# Patient Record
Sex: MALE | Race: BLACK OR AFRICAN AMERICAN | NOT HISPANIC OR LATINO | Employment: FULL TIME | URBAN - METROPOLITAN AREA
[De-identification: names, ages, dates, MRNs, and addresses within clinical notes are randomized per-mention and may not be internally consistent; named-entity substitution may affect disease eponyms.]

---

## 2017-01-02 ENCOUNTER — HOSPITAL ENCOUNTER (OUTPATIENT)
Facility: HOSPITAL | Age: 37
Discharge: HOME | End: 2017-01-03
Attending: EMERGENCY MEDICINE | Admitting: SURGERY
Payer: COMMERCIAL

## 2017-01-02 ENCOUNTER — ANESTHESIA (OUTPATIENT)
Dept: SURGERY | Facility: HOSPITAL | Age: 37
End: 2017-01-02
Payer: COMMERCIAL

## 2017-01-02 ENCOUNTER — SURGERY (OUTPATIENT)
Age: 37
End: 2017-01-02

## 2017-01-02 ENCOUNTER — ANESTHESIA EVENT (OUTPATIENT)
Dept: SURGERY | Facility: HOSPITAL | Age: 37
End: 2017-01-02
Payer: COMMERCIAL

## 2017-01-02 DIAGNOSIS — R11.10 VOMITING, INTRACTABILITY OF VOMITING NOT SPECIFIED, PRESENCE OF NAUSEA NOT SPECIFIED, UNSPECIFIED VOMITING TYPE: ICD-10-CM

## 2017-01-02 DIAGNOSIS — R10.13 EPIGASTRIC ABDOMINAL PAIN: ICD-10-CM

## 2017-01-02 DIAGNOSIS — K81.0 CHOLECYSTITIS, ACUTE: ICD-10-CM

## 2017-01-02 DIAGNOSIS — K81.9 CHOLECYSTITIS: Primary | ICD-10-CM

## 2017-01-02 LAB
ALBUMIN SERPL BCP-MCNC: 3.8 G/DL
ALP SERPL-CCNC: 70 U/L
ALT SERPL W/O P-5'-P-CCNC: 45 U/L
ANION GAP SERPL CALC-SCNC: 11 MMOL/L
AST SERPL-CCNC: 31 U/L
BASOPHILS # BLD AUTO: 0 K/UL
BASOPHILS NFR BLD: 0.2 %
BILIRUB SERPL-MCNC: 0.5 MG/DL
BILIRUB UR QL STRIP: NEGATIVE
BUN SERPL-MCNC: 14 MG/DL
CALCIUM SERPL-MCNC: 9.4 MG/DL
CHLORIDE SERPL-SCNC: 105 MMOL/L
CLARITY UR: CLEAR
CO2 SERPL-SCNC: 22 MMOL/L
COLOR UR: YELLOW
CREAT SERPL-MCNC: 1.1 MG/DL
DIFFERENTIAL METHOD: ABNORMAL
EOSINOPHIL # BLD AUTO: 0 K/UL
EOSINOPHIL NFR BLD: 0.6 %
ERYTHROCYTE [DISTWIDTH] IN BLOOD BY AUTOMATED COUNT: 13.8 %
EST. GFR  (AFRICAN AMERICAN): >60 ML/MIN/1.73 M^2
EST. GFR  (NON AFRICAN AMERICAN): >60 ML/MIN/1.73 M^2
GLUCOSE SERPL-MCNC: 122 MG/DL
GLUCOSE UR QL STRIP: NEGATIVE
HCT VFR BLD AUTO: 44.3 %
HGB BLD-MCNC: 14.9 G/DL
HGB UR QL STRIP: NEGATIVE
KETONES UR QL STRIP: NEGATIVE
LEUKOCYTE ESTERASE UR QL STRIP: NEGATIVE
LIPASE SERPL-CCNC: 57 U/L
LYMPHOCYTES # BLD AUTO: 1.9 K/UL
LYMPHOCYTES NFR BLD: 23.4 %
MCH RBC QN AUTO: 30.4 PG
MCHC RBC AUTO-ENTMCNC: 33.8 %
MCV RBC AUTO: 90 FL
MONOCYTES # BLD AUTO: 0.2 K/UL
MONOCYTES NFR BLD: 2.3 %
NEUTROPHILS # BLD AUTO: 6 K/UL
NEUTROPHILS NFR BLD: 73.5 %
NITRITE UR QL STRIP: NEGATIVE
PH UR STRIP: >8 [PH] (ref 5–8)
PLATELET # BLD AUTO: 306 K/UL
PMV BLD AUTO: 7.2 FL
POTASSIUM SERPL-SCNC: 4.4 MMOL/L
PROT SERPL-MCNC: 7.9 G/DL
PROT UR QL STRIP: NEGATIVE
RBC # BLD AUTO: 4.91 M/UL
SODIUM SERPL-SCNC: 138 MMOL/L
SP GR UR STRIP: 1.02 (ref 1–1.03)
URN SPEC COLLECT METH UR: ABNORMAL
UROBILINOGEN UR STRIP-ACNC: NEGATIVE EU/DL
WBC # BLD AUTO: 8.2 K/UL

## 2017-01-02 PROCEDURE — 37000008 HC ANESTHESIA 1ST 15 MINUTES: Performed by: SURGERY

## 2017-01-02 PROCEDURE — 25000003 PHARM REV CODE 250: Performed by: NURSE ANESTHETIST, CERTIFIED REGISTERED

## 2017-01-02 PROCEDURE — 88304 TISSUE EXAM BY PATHOLOGIST: CPT | Performed by: PATHOLOGY

## 2017-01-02 PROCEDURE — 36415 COLL VENOUS BLD VENIPUNCTURE: CPT

## 2017-01-02 PROCEDURE — 85025 COMPLETE CBC W/AUTO DIFF WBC: CPT

## 2017-01-02 PROCEDURE — 36000708 HC OR TIME LEV III 1ST 15 MIN: Performed by: SURGERY

## 2017-01-02 PROCEDURE — 96376 TX/PRO/DX INJ SAME DRUG ADON: CPT

## 2017-01-02 PROCEDURE — 63600175 PHARM REV CODE 636 W HCPCS: Performed by: EMERGENCY MEDICINE

## 2017-01-02 PROCEDURE — 37000009 HC ANESTHESIA EA ADD 15 MINS: Performed by: SURGERY

## 2017-01-02 PROCEDURE — 25000003 PHARM REV CODE 250: Performed by: EMERGENCY MEDICINE

## 2017-01-02 PROCEDURE — 47562 LAPAROSCOPIC CHOLECYSTECTOMY: CPT | Mod: ,,, | Performed by: SURGERY

## 2017-01-02 PROCEDURE — 99285 EMERGENCY DEPT VISIT HI MDM: CPT | Mod: 25

## 2017-01-02 PROCEDURE — 63600175 PHARM REV CODE 636 W HCPCS: Performed by: NURSE ANESTHETIST, CERTIFIED REGISTERED

## 2017-01-02 PROCEDURE — 25000003 PHARM REV CODE 250: Performed by: ANESTHESIOLOGY

## 2017-01-02 PROCEDURE — 94799 UNLISTED PULMONARY SVC/PX: CPT

## 2017-01-02 PROCEDURE — 96374 THER/PROPH/DIAG INJ IV PUSH: CPT

## 2017-01-02 PROCEDURE — 83690 ASSAY OF LIPASE: CPT

## 2017-01-02 PROCEDURE — 36000709 HC OR TIME LEV III EA ADD 15 MIN: Performed by: SURGERY

## 2017-01-02 PROCEDURE — D9220A PRA ANESTHESIA: Mod: CRNA,,, | Performed by: NURSE ANESTHETIST, CERTIFIED REGISTERED

## 2017-01-02 PROCEDURE — 27201423 OPTIME MED/SURG SUP & DEVICES STERILE SUPPLY: Performed by: SURGERY

## 2017-01-02 PROCEDURE — 93005 ELECTROCARDIOGRAM TRACING: CPT

## 2017-01-02 PROCEDURE — 71000033 HC RECOVERY, INTIAL HOUR: Performed by: SURGERY

## 2017-01-02 PROCEDURE — D9220A PRA ANESTHESIA: Mod: ANES,,, | Performed by: ANESTHESIOLOGY

## 2017-01-02 PROCEDURE — 25000003 PHARM REV CODE 250: Performed by: SURGERY

## 2017-01-02 PROCEDURE — 71000039 HC RECOVERY, EACH ADD'L HOUR: Performed by: SURGERY

## 2017-01-02 PROCEDURE — 81003 URINALYSIS AUTO W/O SCOPE: CPT

## 2017-01-02 PROCEDURE — 80053 COMPREHEN METABOLIC PANEL: CPT

## 2017-01-02 RX ORDER — HYDROMORPHONE HYDROCHLORIDE 2 MG/ML
1 INJECTION, SOLUTION INTRAMUSCULAR; INTRAVENOUS; SUBCUTANEOUS EVERY 4 HOURS PRN
Status: DISCONTINUED | OUTPATIENT
Start: 2017-01-02 | End: 2017-01-03 | Stop reason: HOSPADM

## 2017-01-02 RX ORDER — HYDROCODONE BITARTRATE AND ACETAMINOPHEN 7.5; 325 MG/1; MG/1
1 TABLET ORAL EVERY 4 HOURS PRN
Qty: 40 TABLET | Refills: 0 | Status: SHIPPED | OUTPATIENT
Start: 2017-01-02 | End: 2017-01-12

## 2017-01-02 RX ORDER — SODIUM CHLORIDE, SODIUM LACTATE, POTASSIUM CHLORIDE, CALCIUM CHLORIDE 600; 310; 30; 20 MG/100ML; MG/100ML; MG/100ML; MG/100ML
INJECTION, SOLUTION INTRAVENOUS CONTINUOUS PRN
Status: DISCONTINUED | OUTPATIENT
Start: 2017-01-02 | End: 2017-01-02

## 2017-01-02 RX ORDER — ROCURONIUM BROMIDE 10 MG/ML
INJECTION, SOLUTION INTRAVENOUS
Status: DISCONTINUED | OUTPATIENT
Start: 2017-01-02 | End: 2017-01-02

## 2017-01-02 RX ORDER — KETOROLAC TROMETHAMINE 30 MG/ML
INJECTION, SOLUTION INTRAMUSCULAR; INTRAVENOUS
Status: DISCONTINUED | OUTPATIENT
Start: 2017-01-02 | End: 2017-01-02

## 2017-01-02 RX ORDER — SODIUM CHLORIDE 9 MG/ML
INJECTION, SOLUTION INTRAVENOUS CONTINUOUS
Status: DISCONTINUED | OUTPATIENT
Start: 2017-01-02 | End: 2017-01-03 | Stop reason: HOSPADM

## 2017-01-02 RX ORDER — FAMOTIDINE 20 MG/1
20 TABLET, FILM COATED ORAL 2 TIMES DAILY
Qty: 20 TABLET | Refills: 0 | Status: SHIPPED | OUTPATIENT
Start: 2017-01-02 | End: 2020-08-04

## 2017-01-02 RX ORDER — SUCRALFATE 1 G/1
1 TABLET ORAL 4 TIMES DAILY
Qty: 20 TABLET | Refills: 0 | Status: SHIPPED | OUTPATIENT
Start: 2017-01-02 | End: 2020-08-04

## 2017-01-02 RX ORDER — ONDANSETRON 4 MG/1
4 TABLET, ORALLY DISINTEGRATING ORAL EVERY 8 HOURS PRN
Qty: 12 TABLET | Refills: 0 | Status: SHIPPED | OUTPATIENT
Start: 2017-01-02 | End: 2020-08-04

## 2017-01-02 RX ORDER — MORPHINE SULFATE 4 MG/ML
8 INJECTION, SOLUTION INTRAMUSCULAR; INTRAVENOUS
Status: COMPLETED | OUTPATIENT
Start: 2017-01-02 | End: 2017-01-02

## 2017-01-02 RX ORDER — PANTOPRAZOLE SODIUM 40 MG/10ML
40 INJECTION, POWDER, LYOPHILIZED, FOR SOLUTION INTRAVENOUS
Status: DISCONTINUED | OUTPATIENT
Start: 2017-01-03 | End: 2017-01-03 | Stop reason: HOSPADM

## 2017-01-02 RX ORDER — FENTANYL CITRATE 50 UG/ML
25 INJECTION, SOLUTION INTRAMUSCULAR; INTRAVENOUS EVERY 5 MIN PRN
Status: DISCONTINUED | OUTPATIENT
Start: 2017-01-02 | End: 2017-01-02 | Stop reason: HOSPADM

## 2017-01-02 RX ORDER — ACETAMINOPHEN 10 MG/ML
INJECTION, SOLUTION INTRAVENOUS
Status: DISCONTINUED | OUTPATIENT
Start: 2017-01-02 | End: 2017-01-02

## 2017-01-02 RX ORDER — PHENYLEPHRINE HYDROCHLORIDE 10 MG/ML
INJECTION INTRAVENOUS
Status: DISCONTINUED | OUTPATIENT
Start: 2017-01-02 | End: 2017-01-02

## 2017-01-02 RX ORDER — NEOSTIGMINE METHYLSULFATE 1 MG/ML
INJECTION, SOLUTION INTRAVENOUS
Status: DISCONTINUED | OUTPATIENT
Start: 2017-01-02 | End: 2017-01-02

## 2017-01-02 RX ORDER — PROPOFOL 10 MG/ML
VIAL (ML) INTRAVENOUS
Status: DISCONTINUED | OUTPATIENT
Start: 2017-01-02 | End: 2017-01-02

## 2017-01-02 RX ORDER — MIDAZOLAM HYDROCHLORIDE 1 MG/ML
INJECTION, SOLUTION INTRAMUSCULAR; INTRAVENOUS
Status: DISCONTINUED | OUTPATIENT
Start: 2017-01-02 | End: 2017-01-02

## 2017-01-02 RX ORDER — HYDROCODONE BITARTRATE AND ACETAMINOPHEN 5; 325 MG/1; MG/1
1 TABLET ORAL EVERY 4 HOURS PRN
Status: DISCONTINUED | OUTPATIENT
Start: 2017-01-02 | End: 2017-01-03 | Stop reason: HOSPADM

## 2017-01-02 RX ORDER — LIDOCAINE HYDROCHLORIDE 20 MG/ML
JELLY TOPICAL
Status: DISCONTINUED | OUTPATIENT
Start: 2017-01-02 | End: 2017-01-02

## 2017-01-02 RX ORDER — LIDOCAINE HCL/PF 100 MG/5ML
SYRINGE (ML) INTRAVENOUS
Status: DISCONTINUED | OUTPATIENT
Start: 2017-01-02 | End: 2017-01-02

## 2017-01-02 RX ORDER — ENOXAPARIN SODIUM 100 MG/ML
40 INJECTION SUBCUTANEOUS
Status: DISCONTINUED | OUTPATIENT
Start: 2017-01-03 | End: 2017-01-03 | Stop reason: HOSPADM

## 2017-01-02 RX ORDER — KETAMINE HYDROCHLORIDE 100 MG/ML
INJECTION, SOLUTION INTRAMUSCULAR; INTRAVENOUS
Status: DISCONTINUED | OUTPATIENT
Start: 2017-01-02 | End: 2017-01-02

## 2017-01-02 RX ORDER — FENTANYL CITRATE 50 UG/ML
INJECTION, SOLUTION INTRAMUSCULAR; INTRAVENOUS
Status: DISCONTINUED | OUTPATIENT
Start: 2017-01-02 | End: 2017-01-02

## 2017-01-02 RX ORDER — DEXAMETHASONE SODIUM PHOSPHATE 4 MG/ML
INJECTION, SOLUTION INTRA-ARTICULAR; INTRALESIONAL; INTRAMUSCULAR; INTRAVENOUS; SOFT TISSUE
Status: DISCONTINUED | OUTPATIENT
Start: 2017-01-02 | End: 2017-01-02

## 2017-01-02 RX ORDER — MORPHINE SULFATE 4 MG/ML
4 INJECTION, SOLUTION INTRAMUSCULAR; INTRAVENOUS
Status: COMPLETED | OUTPATIENT
Start: 2017-01-02 | End: 2017-01-02

## 2017-01-02 RX ORDER — SUCCINYLCHOLINE CHLORIDE 20 MG/ML
INJECTION INTRAMUSCULAR; INTRAVENOUS
Status: DISCONTINUED | OUTPATIENT
Start: 2017-01-02 | End: 2017-01-02

## 2017-01-02 RX ORDER — MEPERIDINE HYDROCHLORIDE 50 MG/ML
12.5 INJECTION INTRAMUSCULAR; INTRAVENOUS; SUBCUTANEOUS ONCE
Status: COMPLETED | OUTPATIENT
Start: 2017-01-02 | End: 2017-01-02

## 2017-01-02 RX ORDER — ONDANSETRON 2 MG/ML
4 INJECTION INTRAMUSCULAR; INTRAVENOUS EVERY 8 HOURS PRN
Status: DISCONTINUED | OUTPATIENT
Start: 2017-01-02 | End: 2017-01-03 | Stop reason: HOSPADM

## 2017-01-02 RX ORDER — ONDANSETRON HYDROCHLORIDE 2 MG/ML
INJECTION, SOLUTION INTRAMUSCULAR; INTRAVENOUS
Status: DISCONTINUED | OUTPATIENT
Start: 2017-01-02 | End: 2017-01-02

## 2017-01-02 RX ORDER — GLYCOPYRROLATE 0.2 MG/ML
INJECTION INTRAMUSCULAR; INTRAVENOUS
Status: DISCONTINUED | OUTPATIENT
Start: 2017-01-02 | End: 2017-01-02

## 2017-01-02 RX ORDER — BUPIVACAINE HYDROCHLORIDE AND EPINEPHRINE 2.5; 5 MG/ML; UG/ML
INJECTION, SOLUTION EPIDURAL; INFILTRATION; INTRACAUDAL; PERINEURAL
Status: DISCONTINUED | OUTPATIENT
Start: 2017-01-02 | End: 2017-01-02 | Stop reason: HOSPADM

## 2017-01-02 RX ORDER — ONDANSETRON 4 MG/1
4 TABLET, ORALLY DISINTEGRATING ORAL
Status: COMPLETED | OUTPATIENT
Start: 2017-01-02 | End: 2017-01-02

## 2017-01-02 RX ORDER — SODIUM CHLORIDE 0.9 % (FLUSH) 0.9 %
3 SYRINGE (ML) INJECTION
Status: DISCONTINUED | OUTPATIENT
Start: 2017-01-02 | End: 2017-01-02 | Stop reason: HOSPADM

## 2017-01-02 RX ORDER — CEFAZOLIN SODIUM 1 G/3ML
INJECTION, POWDER, FOR SOLUTION INTRAMUSCULAR; INTRAVENOUS
Status: DISCONTINUED | OUTPATIENT
Start: 2017-01-02 | End: 2017-01-02

## 2017-01-02 RX ADMIN — MORPHINE SULFATE 8 MG: 4 INJECTION, SOLUTION INTRAMUSCULAR; INTRAVENOUS at 06:01

## 2017-01-02 RX ADMIN — SODIUM CHLORIDE: 0.9 INJECTION, SOLUTION INTRAVENOUS at 02:01

## 2017-01-02 RX ADMIN — MIDAZOLAM 2 MG: 1 INJECTION INTRAMUSCULAR; INTRAVENOUS at 11:01

## 2017-01-02 RX ADMIN — CEFAZOLIN 2 G: 1 INJECTION, POWDER, FOR SOLUTION INTRAVENOUS at 12:01

## 2017-01-02 RX ADMIN — FENTANYL CITRATE 100 MCG: 50 INJECTION INTRAMUSCULAR; INTRAVENOUS at 12:01

## 2017-01-02 RX ADMIN — PHENYLEPHRINE HYDROCHLORIDE 100 MCG: 10 INJECTION INTRAVENOUS at 12:01

## 2017-01-02 RX ADMIN — DEXAMETHASONE SODIUM PHOSPHATE 4 MG: 4 INJECTION, SOLUTION INTRAMUSCULAR; INTRAVENOUS at 12:01

## 2017-01-02 RX ADMIN — KETOROLAC TROMETHAMINE 30 MG: 30 INJECTION, SOLUTION INTRAMUSCULAR; INTRAVENOUS at 12:01

## 2017-01-02 RX ADMIN — GLYCOPYRROLATE 0.4 MG: 0.2 INJECTION, SOLUTION INTRAMUSCULAR; INTRAVENOUS at 12:01

## 2017-01-02 RX ADMIN — SUCCINYLCHOLINE CHLORIDE 200 MG: 20 INJECTION, SOLUTION INTRAMUSCULAR; INTRAVENOUS at 12:01

## 2017-01-02 RX ADMIN — BUPIVACAINE HYDROCHLORIDE AND EPINEPHRINE BITARTRATE 30 ML: 2.5; .0091 INJECTION, SOLUTION EPIDURAL; INFILTRATION; INTRACAUDAL; PERINEURAL at 12:01

## 2017-01-02 RX ADMIN — ONDANSETRON 4 MG: 2 INJECTION, SOLUTION INTRAMUSCULAR; INTRAVENOUS at 12:01

## 2017-01-02 RX ADMIN — MEPERIDINE HYDROCHLORIDE 12.5 MG: 50 INJECTION INTRAMUSCULAR; INTRAVENOUS; SUBCUTANEOUS at 02:01

## 2017-01-02 RX ADMIN — ACETAMINOPHEN 1000 MG: 10 INJECTION, SOLUTION INTRAVENOUS at 12:01

## 2017-01-02 RX ADMIN — ROCURONIUM BROMIDE 10 MG: 10 INJECTION, SOLUTION INTRAVENOUS at 12:01

## 2017-01-02 RX ADMIN — LIDOCAINE HYDROCHLORIDE: 20 SOLUTION ORAL; TOPICAL at 06:01

## 2017-01-02 RX ADMIN — LIDOCAINE HYDROCHLORIDE 100 MG: 20 INJECTION, SOLUTION INTRAVENOUS at 12:01

## 2017-01-02 RX ADMIN — ONDANSETRON 4 MG: 4 TABLET, ORALLY DISINTEGRATING ORAL at 05:01

## 2017-01-02 RX ADMIN — MORPHINE SULFATE 4 MG: 4 INJECTION, SOLUTION INTRAMUSCULAR; INTRAVENOUS at 09:01

## 2017-01-02 RX ADMIN — LIDOCAINE HYDROCHLORIDE 2 ML: 20 JELLY TOPICAL at 12:01

## 2017-01-02 RX ADMIN — PROPOFOL 200 MG: 10 INJECTION, EMULSION INTRAVENOUS at 12:01

## 2017-01-02 RX ADMIN — SODIUM CHLORIDE, SODIUM LACTATE, POTASSIUM CHLORIDE, AND CALCIUM CHLORIDE: .6; .31; .03; .02 INJECTION, SOLUTION INTRAVENOUS at 11:01

## 2017-01-02 RX ADMIN — KETAMINE HYDROCHLORIDE 30 MG: 100 INJECTION, SOLUTION, CONCENTRATE INTRAMUSCULAR; INTRAVENOUS at 12:01

## 2017-01-02 RX ADMIN — NEOSTIGMINE METHYLSULFATE 3 MG: 1 INJECTION INTRAVENOUS at 12:01

## 2017-01-02 NOTE — ANESTHESIA POSTPROCEDURE EVALUATION
"Anesthesia Post Evaluation    Patient: Aldo Aguilar    Procedure(s) Performed: Procedure(s):  CHOLECYSTECTOMY-LAPAROSCOPIC    Final Anesthesia Type: general  Patient location during evaluation: PACU  Patient participation: Yes- Able to Participate  Level of consciousness: awake and alert  Post-procedure vital signs: reviewed and stable  Pain management: adequate  Airway patency: patent  PONV status at discharge: No PONV  Anesthetic complications: no      Cardiovascular status: hemodynamically stable  Respiratory status: unassisted and room air  Hydration status: euvolemic  Follow-up not needed.        Visit Vitals    BP (!) 145/70    Pulse (!) 112    Temp 36.6 °C (97.8 °F) (Oral)    Resp 18    Ht 5' 10" (1.778 m)    Wt 122.5 kg (270 lb)    SpO2 96%    BMI 38.74 kg/m2       Pain/Lloyd Score: Pain Assessment Performed: Yes (1/2/2017  2:05 PM)  Presence of Pain: denies (1/2/2017  2:05 PM)  Pain Rating Prior to Med Admin: 0 (1/2/2017  2:00 PM)  Lloyd Score: 8 (1/2/2017  2:05 PM)      "

## 2017-01-02 NOTE — ED PROVIDER NOTES
Encounter Date: 1/2/2017       History     Chief Complaint   Patient presents with    Abdominal Pain     abdominal pain with vomiting that started tonight     Review of patient's allergies indicates:  No Known Allergies  HPI   She is a 36-year-old man who presents emergency department for evaluation of gradual onset of worsening epigastric abdominal pain with associated nausea and vomiting started last night.  She states he's been vomiting since approximately midnight.  He describes emesis as nonbilious and nonbloody.  Denies any fever.  No association to food. No diarrhea.  History reviewed. No pertinent past medical history.  No past medical history pertinent negatives.  History reviewed. No pertinent past surgical history.  Family History   Problem Relation Age of Onset    No Known Problems Mother     No Known Problems Father     No Known Problems Sister     No Known Problems Brother     No Known Problems Daughter     Asthma Son      Social History   Substance Use Topics    Smoking status: Never Smoker    Smokeless tobacco: None    Alcohol use Yes      Comment: occasionally     Review of Systems  REVIEW OF SYSTEMS  CONSTITUTIONAL: Negative for fever.  HEENT:  Negative for sore throat.   HEART:   Negative for chest pain..  LUNG:  Negative for shortness of breath.  ABDOMEN:  Positive for nausea, vomiting, abdominal pain  :  No discharge, dysuria  EXTREMITIES:  No swelling  NEURO:  Negative for weakness.   SKIN:  Negative for rash.  Psych: No depression  HEME: Does not bruise/bleed easily.           Physical Exam   Initial Vitals   BP Pulse Resp Temp SpO2   01/02/17 0515 01/02/17 0515 01/02/17 0515 01/02/17 0515 01/02/17 0515   147/88 82 18 98 °F (36.7 °C) 100 %     Physical Exam  Physical Exam   Nurses notes and vitals reviewed.  Constitutional:Oriented to person, place, and time. Appears to be in obvious pain.  HEENT: PERRL, EOMI, Conjunctivae are normal. Moist mucous membranes. Normocephalic.  Atraumatic, no acute, traumatic or infectious process noted.  Neck: Normal range of motion, supple, no JVD.  Cardiovascular: Normal rate, regular rhythm, normal heart sounds and intact distal pulses.   Pulmonary/Chest: Effort normal and breath sounds normal. No respiratory distress. No wheezes,  rales or ronchi.   Abdominal: Soft, no distension. Right upper quadrant and epigastric tenderness is present without rebound or gaurding.  No Lamb's, Rovsing's, or McBurney's point tenderness.  No CVA tenderness.   Back:  No midline TTP; no acute abnormal, traumatic or infectious process noted  Musculoskeletal: Moves all extremities well.  Full range of motion.  5/5 strength.  No sensory deficits.  No C/C/E.  2+ pulses throughout  Neurological: Alert and oriented to person, place, and time. Cranial nerves II through XII are grossly intact without anyfocal motor, sensory, and cerebellar findings.  Skin: Skin is warm and dry, no rashes.  Psych: Full affect, cooperative      ED Course   Procedures  Labs Reviewed   CBC W/ AUTO DIFFERENTIAL - Abnormal; Notable for the following:        Result Value    MPV 7.2 (*)     Mono # 0.2 (*)     Gran% 73.5 (*)     Mono% 2.3 (*)     All other components within normal limits   COMPREHENSIVE METABOLIC PANEL - Abnormal; Notable for the following:     CO2 22 (*)     Glucose 122 (*)     ALT 45 (*)     All other components within normal limits   URINALYSIS - Abnormal; Notable for the following:     pH, UA >8.0 (*)     All other components within normal limits   LIPASE     EKG Readings: (Independently Interpreted)   Initial Reading: No STEMI.   Normal sinus rhythm, 89 BPM, nonspecific T-wave flattening in lead III. Normal ST segments, normal intervals.  Normal axis.       X-Rays:   Independently Interpreted Readings:   Abdomen:   Abdomen and Pelvis without Contrast - Severely distended gallbladder.   Other Readings:  XRAY Abd flat and erect: there is no free air under the diaphram. No evidence  of obstruction. No emergent acute process identified.    I independently viewed and interpreted the chest xray as normal appearing cardiac and mediastinal sizes and contours. There are no intrapulmonary masses, infiltrates, pneumothorax or pleural effusions seen. The soft tissues and bony structures appear unremarkable.  My overall impression is no acute cardiopulmonary process.          Medical Decision Making:   Initial Assessment:   36-year-old man who presents to the emergency department with complaints of epigastric and right upper quadrant abdominal pain.  CT shows a significantly distended gallbladder.  No evidence for perforation.  He has normal bilirubin and a slightly elevated ALTs of 45.  No evidence of acute pancreatitis.  We will turn over to Dr. Tran pending ultrasound of the right upper quadrant to rule out acute cholecystitis.                   ED Course     Clinical Impression:   The primary encounter diagnosis was Epigastric abdominal pain. Diagnoses of Vomiting, intractability of vomiting not specified, presence of nausea not specified, unspecified vomiting type and Cholecystitis, acute were also pertinent to this visit.          Ramírez Mitchell MD  01/02/17 2005

## 2017-01-02 NOTE — PROGRESS NOTES
01/02/17 1505   Incentive Spirometer   $ Administration (Incentive Spirometer) done with encouragement   Number of Repetitions (Incentive Spirometer) 10   Level (mL) (Incentive Spirometer) 2500

## 2017-01-02 NOTE — ED NOTES
Patient identifiers for Aldo Aguilar checked and correct.  LOC: Patient is awake, alert, and aware of environment with an appropriate affect. Patient is oriented x 3 and speaking appropriately.  APPEARANCE: Patient is clean and well groomed, patient's clothing is properly fastened.  SKIN: The skin is warm and dry. Patient has normal skin turgor and moist mucus membrances. Skin is intact; no bruising or breakdown noted.  MUSCULOSKELETAL: Patient is moving all extremities well, no obvious deformities noted. Pulses intact.   RESPIRATORY: Airway is open and patent. Respirations are spontaneous and non-labored with normal effort and rate. Pt placed on continuous pulse ox.  CARDIAC: Patient has a tachycardic rate and rhythm. No peripheral edema noted. Capillary refill < 3 seconds. Pt placed on continuous cardiac monitor.  ABDOMEN: No distention noted. Bowel sounds active in all 4 quadrants. Pt reports abd pain since midnight. Pt reports epigastric abd pain. EKG obtained. Pt reports associated nausea and vomiting. Tenderness to palpation in epigastric region and LUQ.   NEUROLOGICAL: PERRL. Facial expression is symmetrical. Hand grasps are equal bilaterally. Normal sensation in all extremities when touched with finger.  Allergies reported: Review of patient's allergies indicates:  No Known Allergies  Bed locked, lowest position. Call light within easy reach. Side rails up x2.

## 2017-01-02 NOTE — TRANSFER OF CARE
"Anesthesia Transfer of Care Note    Patient: Aldo Aguilar    Procedure(s) Performed: Procedure(s):  CHOLECYSTECTOMY-LAPAROSCOPIC    Patient location: PACU    Anesthesia Type: general    Transport from OR: Transported from OR on 2-3 L/min O2 by NC with adequate spontaneous ventilation    Post pain: adequate analgesia    Post assessment: no apparent anesthetic complications    Post vital signs: stable    Level of consciousness: awake, alert and oriented    Nausea/Vomiting: no nausea/vomiting    Complications: none          Last vitals:   Visit Vitals    BP (!) 149/85    Pulse (!) 112    Temp 36.7 °C (98 °F) (Oral)    Resp 18    Ht 5' 10" (1.778 m)    Wt 122.5 kg (270 lb)    SpO2 (!) 94%    BMI 38.74 kg/m2     "

## 2017-01-02 NOTE — PLAN OF CARE
Pt drowsy, but easily aroused, vss, on 2 liter nc, tolerating clear liquids, denies pain and nausea, Dr. Moffett at bedside and states pt meets anesthesia criteria to transfer pt to floor, dressing cdi.  Spouse updated and she chose to go to room

## 2017-01-02 NOTE — IP AVS SNAPSHOT
[address] [place] [address]  Phone: [phone]           I have received a copy of my After Visit Summary and discharge instructions from [facility].    INSTRUCTIONS RECEIVED AND UNDERSTOOD BY:                     Patient/Patient Representative: ________________________________________________________________     Date/Time: ________________________________________________________________                     Instructions Given By: ________________________________________________________________     Date/Time: ________________________________________________________________

## 2017-01-02 NOTE — IP AVS SNAPSHOT
[address] [place] [address]  Phone: [phone]           Patient Discharge Instructions     Our goal is to set you up for success. This packet includes information on your condition, medications, and your home care. It will help you to care for yourself so you don't get sicker and need to go back to the hospital.     Please ask your nurse if you have any questions.        There are many details to remember when preparing to leave the hospital. Here is what you will need to do:    1. Take your medicine. If you are prescribed medications, review your Medication List in the following pages. You may have new medications to  at the pharmacy and others that you'll need to stop taking. Review the instructions for how and when to take your medications. Talk with your doctor or nurses if you are unsure of what to do.     2. Go to your follow-up appointments. Specific follow-up information is listed in the following pages. Your may be contacted by a transition nurse or clinical provider about future appointments. Be sure we have all of the phone numbers to reach you, if needed. Please contact your provider's office if you are unable to make an appointment.     3. Watch for warning signs. Your doctor or nurse will give you detailed warning signs to watch for and when to call for assistance. These instructions may also include educational information about your condition. If you experience any of warning signs to your health, call your doctor.               ** Verify the list of medication(s) below is accurate and up to date. Carry this with you in case of emergency. If your medications have changed, please notify your healthcare provider.             Medication List      START taking these medications        Additional Info                      famotidine 20 MG tablet   Commonly known as:  PEPCID   Quantity:  20 tablet   Refills:  0   Dose:  20 mg    Instructions:   Take 1 tablet (20 mg total) by mouth 2 (two) times daily.     Begin Date    AM    Noon    PM    Bedtime       * hydrocodone-acetaminophen 7.5-325mg 7.5-325 mg per tablet   Commonly known as:  NORCO   Quantity:  40 tablet   Refills:  0   Dose:  1 tablet    Instructions:  Take 1 tablet by mouth every 4 (four) hours as needed for Pain.     Begin Date    AM    Noon    PM    Bedtime       * hydrocodone-acetaminophen 7.5-325mg 7.5-325 mg per tablet   Commonly known as:  NORCO   Quantity:  40 tablet   Refills:  0   Dose:  1 tablet    Instructions:  Take 1 tablet by mouth every 4 (four) hours as needed for Pain.     Begin Date    AM    Noon    PM    Bedtime       ondansetron 4 MG Tbdl   Commonly known as:  ZOFRAN-ODT   Quantity:  12 tablet   Refills:  0   Dose:  4 mg    Last time this was given:  4 mg on 1/2/2017  5:29 AM   Instructions:  Take 1 tablet (4 mg total) by mouth every 8 (eight) hours as needed.     Begin Date    AM    Noon    PM    Bedtime       sucralfate 1 gram tablet   Commonly known as:  CARAFATE   Quantity:  20 tablet   Refills:  0   Dose:  1 g    Instructions:  Take 1 tablet (1 g total) by mouth 4 (four) times daily.     Begin Date    AM    Noon    PM    Bedtime       * Notice:  This list has 2 medication(s) that are the same as other medications prescribed for you. Read the directions carefully, and ask your doctor or other care provider to review them with you.         Where to Get Your Medications      You can get these medications from any pharmacy     Bring a paper prescription for each of these medications     famotidine 20 MG tablet    hydrocodone-acetaminophen 7.5-325mg 7.5-325 mg per tablet    hydrocodone-acetaminophen 7.5-325mg 7.5-325 mg per tablet    ondansetron 4 MG Tbdl    sucralfate 1 gram tablet                  Please bring to all follow up appointments:    1. A copy of your discharge instructions.  2. All medicines you are currently taking in their original bottles.  3. Identification and  insurance card.    Please arrive 15 minutes ahead of scheduled appointment time.    Please call 24 hours in advance if you must reschedule your appointment and/or time.        Follow-up Information     Schedule an appointment as soon as possible for a visit with [facility].    Specialty:  Family Medicine    Contact information:    [address]  [facility] [address] [phone]        Follow up with [facility].    Specialty:  Emergency Medicine    Why:  As needed, If symptoms worsen    Contact information:    [address]  [facility] [address] [phone]        Follow up with Dion Kelsey MD In 2 weeks.    Specialties:  General Surgery, Surgery    Why:  hospital follow - MD nurse will call pt to schedule    Contact information:    [address]  [address]  [facility] [address] [phone]          Discharge Instructions     Future Orders    Diet general     Questions:    Total calories:      Fat restriction, if any:      Protein restriction, if any:      Na restriction, if any:      Fluid restriction:      Additional restrictions:          Discharge Instructions         Diet for Vomiting or Diarrhea (Adult)    If your symptoms return or get worse after eating certain foods listed below, you should stop eating them until your symptoms ease and you feel better.  Once the vomiting stops, then follow the steps below.   During the first 12 to 24 hours  During the first 12 to 24 hours, follow this diet:  · Beverages. Plain water, sport drinks like electrolyte solutions, soft drinks without caffeine, mineral water (plain or flavored), clear fruit juices, and decaffeinated tea and coffee.  · Soups. Clear broth, consommé, and bouillon.  · Desserts. Plain gelatin, popsicles, and fruit juice bars. As you feel better, you may add 6 to 8 ounces of yogurt per day. If you have diarrhea, don't have foods or beverages that contain sugar, high-fructose corn  syrup, or sugar alcohols.  During the next 24 hours  During the next 24 hours you may add the following to the above:  · Hot cereal, plain toast, bread, rolls, and crackers  · Plain noodles, rice, mashed potatoes, and chicken noodle or rice soup  · Unsweetened canned fruit (but not pineapple) and bananas  Don't have more than 15 grams of fat a day. Do this by staying away from margarine, butter, oils, mayonnaise, sauces, gravies, fried foods, peanut butter, meat, poultry, and fish.  Don't eat much fiber. Stay away from raw or cooked vegetables, fresh fruits (except bananas), and bran cereals.  Limit how much caffeine and chocolate you have. Do not use any spices or seasonings except salt.  During the next 24 hours  Gradually go back to your normal diet, as you feel better and your symptoms ease.  © 8567-3408 [employer]. [address] [place] [address]. All rights reserved. This information is not intended as a substitute for professional medical care. Always follow your healthcare professional's instructions.          Epigastric Pain (Uncertain Cause)    Epigastric pain can be a sign of disease in the upper abdomen. Common causes include:  · Acid reflux (stomach acid flowing up into the esophagus)  · Gastritis (irritation of the stomach lining)  · Peptic Ulcer Disease  · Inflammation of the pancreas  · Gallstone  · Infection in the gallbladder  Pain may be dull or burning. It may spread upward to the chest or to the back. There may be other symptoms such as belching, bloating, cramps or hunger pains. There may be weight loss or poor appetite, nausea or vomiting.  Since the diagnosis of your pain is not certain yet, further tests may sometimes be needed. Sometimes the doctor will treat you for the most likely condition to see if there is improvement before doing further tests.  Home care  Medicines  · Antacids help neutralize the normal acids in your stomach. Examples are Maalox, Mylanta,  Rolaids, and Tums. If you dont like the liquid, you can also try a chewable one. You may find one works better than another for you. Overuse can cause diarrhea or constipation.  · Acid blockers (H2 blockers) decrease acid production. Examples are cimetidine (Tagamet), famotidine (Pepcid) and ranitidine (Zantac).  · Acid inhibitors (PPIs) decrease acid production in a different way than the blockers. You may find they work better, but can take a little longer to take effect.  Examples are omeprazole (Prilosec), lansoprazole (Prevacid), pantoprazole (Protonix), rabeprazole (Aciphex), and esomeprazole (Nexium).  · Take an antacid 30-60 minutes after eating and at bedtime, but not at the same time as an acid blocker.  · Try not to take NSAIDs. Aspirin may also cause problems, but if taking it for your heart or other medical reasons, talk to your doctor before stopping it; you do not want to cause a worse problem, like a heart attack or stroke.  Diet  · If certain foods seem to cause your spasm, try to avoid them.   · Eat slowly and chew food well before swallowing. Symptoms of gastritis can be worsened by certain foods. Limit or avoid fatty, fried, and spicy foods, as well as coffee, chocolate, mint, and foods with high acid content such as tomatoes and citrus fruit and juices (orange, grapefruit, lemon).  · Avoid alcohol, caffeine, and tobacco, which can delay healing and worsen your problem.  · Try eating smaller meals with snacks in between  Follow-up care  Follow up with your healthcare provider or as advised.  When to seek medical advice  Call your healthcare provider right away if any of the following occur:  · Stomach pain worsens or moves to the right lower part of the abdomen  · Chest pain appears, or if it worsens or spreads to the chest, back, neck, shoulder, or arm  · Frequent vomiting (cant keep down liquids)  · Blood in the stool or vomit (red or black color)  · Feeling weak or dizzy, fainting, or having  "trouble breathing  · Fever of 100.4ºF (38ºC) or higher, or as directed by your healthcare provider  · Abdominal swelling              © 2532-2635 [employer]. [address] [place] [address], [place] [address]. All rights reserved. This information is not intended as a substitute for professional medical care. Always follow your healthcare professional's instructions.            Primary Diagnosis     Your primary diagnosis was:  Gall Bladder Inflammation      Admission Information     Date & Time Department CSN    1/2/2017  5:17 AM [facility] [id number]      Care Providers     Provider Role Specialty Primary office phone    Dion Kelsey MD Surgeon  General Surgery [phone]      Your Vitals Were     BP Pulse Temp Resp Height Weight    110/74 (BP Location: Left arm, Patient Position: Lying, BP Method: Automatic) 94 98.3 °F (36.8 °C) (Oral) 18 5' 10" (1.778 m) 122.5 kg (270 lb)    SpO2 BMI             96% 38.74 kg/m2         Recent Lab Values     No lab values to display.      Pending Labs     Order Current Status    Specimen to Pathology - Surgery In process      Allergies as of 1/3/2017        Reactions    Shellfish Containing Products Swelling    Fish Containing Products Swelling      Advance Directives     An advance directive is a document which, in the event you are no longer able to make decisions for yourself, tells your healthcare team what kind of treatment you do or do not want to receive, or who you would like to make those decisions for you.  If you do not currently have an advance directive, [facility] encourages you to create one.  For more information call:  [phone]), [phone] ([phone]),  or log on to [email or web address].        Language Assistance Services     ATTENTION: Language assistance services are available, free of charge. Please call [phone].      ATENCIÓN: Si habla español, tiene a alvarenga disposición servicios gratuitos de " anthonystencia lingüística. Cecil al [phone].     [facility] Ý: N?u b?n nói Ti?ng Vi?t, có các d?ch v? h? tr? ngôn ng? mi?n phí dành cho b?n. G?i s? [phone].        [facility] Sign-Up     Activating your [facility] account is as easy as 1-2-3!     1) Visit [email or web address], select Sign Up Now, enter this activation code and your date of birth, then select Next.  [email or web address]  Expires: 2/16/2017  7:38 AM      2) Create a username and password to use when you visit [facility] in the future and select a security question in case you lose your password and select Next.    3) Enter your e-mail address and click Sign Up!    Additional Information  If you have questions, please e-mail [email or web address] or call [phone] to talk to our [facility] staff. Remember, [facility] is NOT to be used for urgent needs. For medical emergencies, dial 911.          [facility] complies with applicable Federal civil rights laws and does not discriminate on the basis of race, color, national origin, age, disability, or sex.

## 2017-01-02 NOTE — OP NOTE
PATIENT NAME:  Aldo Aguilar     DATE OF PROCEDURE: 1/2/2017    PROCEDURE: Laparoscopic Cholecystectomy    PREOPERATIVE DIAGNOSIS: Cholelithiasis / Cholecystitis   POSTOPERATIVE DIAGNOSIS: Cholelithiasis / Cholecystitis     SURGEON: Dion Treadwell Jr., M.D.  ASSISTANT: None     DESCRIPTION OF PROCEDURE: After appropriate consent was obtained, consent forms   signed and questions answered, the patient was taken to the Operating Room and   placed on the operating room table where general endotracheal anesthesia was   induced without difficulty. Preoperative antibiotics were administered. SCDs were in   place. A Timeout procedure was performed. The abdomen was prepped and draped in the usual sterile fashion. A midline incision was made beneath the umbilicus. Dissection was performed down to the fascia, which was incised. Stay sutures were placed on the fascia and the Valery trocar was inserted. The abdomen was insufflated. Under direct   vision and after injection with local anesthetic, a 5 mm trocar was placed in   the upper midline. A second 5 mm trocar was placed between these two. A third 5 mm trocar was placed. The   gallbladder was identified, grasped, and retracted. It was attached to the left lobe of the liver. There was some mild to moderate  inflammation. The cystic duct was identified and carefully dissected. Three clips   were placed on the common duct side, 2 on the gallbladder side, and the duct was   transected. The artery was identified, dissected, clipped and cut as well. The   gallbladder was then dissected free of the liver bed with electrocautery. It was   placed in a retrieval bag and removed through the umbilical port site. No bile   was spilled. The gallbladder fossa was examined and found to be hemostatic. The trocars were then removed under direct vision.The abdomen was desufflated. The fascia at the umbilicus was closed with interrupted 0 Vicryl suture and additional local was injected.  The skin was then closed with 4-0 Monocryl subcuticular stitches. Steri-Strips were then applied to all incisions. The patient was awakened, extubated and transferred to the Recovery Room in good condition. The patient tolerated the procedure without complication. Lap and instrument counts were reported as correct at the termination of the procedure.    EBL: 20 cc  SPECIMEN: gallbladder  COMPLICATIONS: none  ANESTHESIA: General

## 2017-01-02 NOTE — ED NOTES
Pt transported to surgery via wheelchair. Pt aaox4, resp even and unlabored, skin warm dry appropriate for race. Nadn. significant other at bedside

## 2017-01-02 NOTE — PROVIDER PROGRESS NOTES - EMERGENCY DEPT.
Encounter Date: 1/2/2017    ED Physician Progress Notes        Physician Note:   Case accepted from Dr. Mitchell at 0700 pending CT abdomen/pelvis report and disposition. I agree with previous physician's history/physical and overall assessment.  CT report reviewed which showed dilatation of the gallbladder.  We will order ultrasound of the gallbladder/right upper quadrant per radiology recommendation.  Patient informed of results and plan.  States that his pain is returning, will order morphine 4 mg IV.    Ultrasound abdomen/right upper quadrant shows distended gallbladder with 2 cm stone in the neck.  Mild gallbladder wall thickening and equivocal sonographic Lamb sign.  I discussed the case with the radiologist who suggests possible early acute cholecystitis.  Spoke with general surgeon, Dr. Kelsey, who will come down to take the patient to surgery.      Impression:  1. Epigastric abdominal pain  (primary encounter diagnosis)  2. Vomiting, intractability of vomiting not specified, presence of nausea not specified, unspecified vomiting type  3. Cholecystitis, acute

## 2017-01-02 NOTE — LETTER
January 3, 2017         [address] [place] [id number]  Phone: [phone]       Patient: Aldo Aguilar  YOB: 1980  Date of Visit: 01/03/2017    To Whom It May Concern:    Aldo Aguilar was at [facility] on 01/03/2017. He may return to work on 01/09/2017 with restrictions. No heavy lifting. If you have any questions or concerns, or if I can be of further assistance, please do not hesitate to contact me.    Sincerely,        Conchis Macedo RN

## 2017-01-02 NOTE — ED AVS SNAPSHOT
[facility] [address] [place] [address]               Aldo Aguilar   2017  5:17 AM   ED    Description:  Male : 1980   Department:  [facility]           Your Care was Coordinated By:     Provider Role From To    Ramírez Mitchell MD Attending Provider 17 0516 --      Reason for Visit     Abdominal Pain           Diagnoses this Visit        Comments    Epigastric abdominal pain    -  Primary     Vomiting, intractability of vomiting not specified, presence of nausea not specified, unspecified vomiting type           ED Disposition     None           To Do List           Follow-up Information     Schedule an appointment as soon as possible for a visit with [facility].    Specialty:  Family Medicine    Contact information:    [address]  [facility] [address] [phone]        Follow up with [facility].    Specialty:  Emergency Medicine    Why:  As needed, If symptoms worsen    Contact information:    [address] [place] [address] [phone]       These Medications        Disp Refills Start End    ondansetron (ZOFRAN-ODT) 4 MG TbDL 12 tablet 0 2017     Take 1 tablet (4 mg total) by mouth every 8 (eight) hours as needed. - Oral    famotidine (PEPCID) 20 MG tablet 20 tablet 0 2017    Take 1 tablet (20 mg total) by mouth 2 (two) times daily. - Oral    sucralfate (CARAFATE) 1 gram tablet 20 tablet 0 2017     Take 1 tablet (1 g total) by mouth 4 (four) times daily. - Oral      [facility]     [facility] Care [facility] -  Assistance  Registered nurses in the [facility] provide clinical advisement, health education, appointment booking, and other advisory services.  Call for this free service at [phone].             Medications           START taking these NEW medications        Refills     "ondansetron (ZOFRAN-ODT) 4 MG TbDL 0    Sig: Take 1 tablet (4 mg total) by mouth every 8 (eight) hours as needed.    Class: Print    Route: Oral    famotidine (PEPCID) 20 MG tablet 0    Sig: Take 1 tablet (20 mg total) by mouth 2 (two) times daily.    Class: Print    Route: Oral    sucralfate (CARAFATE) 1 gram tablet 0    Sig: Take 1 tablet (1 g total) by mouth 4 (four) times daily.    Class: Print    Route: Oral      These medications were administered today        Dose Freq    ondansetron disintegrating tablet 4 mg 4 mg ED 1 Time    Sig: Take 1 tablet (4 mg total) by mouth ED 1 Time.    Class: Normal    Route: Oral    morphine injection 8 mg 8 mg ED 1 Time    Sig: Inject 2 mLs (8 mg total) into the vein ED 1 Time.    Class: Normal    Route: Intravenous    (pyxis) gi cocktail (mylanta 30 mL, lidocaine 2 % viscous 10 mL, dicyclomine 10 mL) 50 mL  ED 1 Time    Sig: Take by mouth ED 1 Time.    Class: Normal    Route: Oral           Verify that the below list of medications is an accurate representation of the medications you are currently taking.  If none reported, the list may be blank. If incorrect, please contact your healthcare provider. Carry this list with you in case of emergency.           Current Medications     famotidine (PEPCID) 20 MG tablet Take 1 tablet (20 mg total) by mouth 2 (two) times daily.    ondansetron (ZOFRAN-ODT) 4 MG TbDL Take 1 tablet (4 mg total) by mouth every 8 (eight) hours as needed.    sucralfate (CARAFATE) 1 gram tablet Take 1 tablet (1 g total) by mouth 4 (four) times daily.           Clinical Reference Information           Your Vitals Were     BP Pulse Temp Resp Height Weight    139/80 82 98 °F (36.7 °C) (Oral) 18 5' 10" (1.778 m) 122.5 kg (270 lb)    SpO2 BMI             100% 38.74 kg/m2         Allergies as of 1/2/2017     No Known Allergies      Immunizations Administered on Date of Encounter - 1/2/2017     None      ED Micro, Lab, POCT     Start Ordered       Status Ordering " Provider    01/02/17 0517 01/02/17 0517  CBC W/ AUTO DIFFERENTIAL  Once      Final result     01/02/17 0517 01/02/17 0517  Comp. Metabolic Panel  STAT      Final result     01/02/17 0517 01/02/17 0517  Lipase  STAT      Final result     01/02/17 0517 01/02/17 0517  Urinalysis - Clean Catch  STAT      In process       ED Imaging Orders     Start Ordered       Status Ordering Provider    01/02/17 0630 01/02/17 0629  CT Abdomen Pelvis  Without Contrast  1 time imaging      In process     01/02/17 0517 01/02/17 0517  X-ray chest PA and lateral  1 time imaging     Comments:  Abdominal pain    In process     01/02/17 0517 01/02/17 0517  X-Ray Abdomen Flat And Erect  1 time imaging      In process         Discharge Instructions         Diet for Vomiting or Diarrhea (Adult)    If your symptoms return or get worse after eating certain foods listed below, you should stop eating them until your symptoms ease and you feel better.  Once the vomiting stops, then follow the steps below.   During the first 12 to 24 hours  During the first 12 to 24 hours, follow this diet:  · Beverages. Plain water, sport drinks like electrolyte solutions, soft drinks without caffeine, mineral water (plain or flavored), clear fruit juices, and decaffeinated tea and coffee.  · Soups. Clear broth, consommé, and bouillon.  · Desserts. Plain gelatin, popsicles, and fruit juice bars. As you feel better, you may add 6 to 8 ounces of yogurt per day. If you have diarrhea, don't have foods or beverages that contain sugar, high-fructose corn syrup, or sugar alcohols.  During the next 24 hours  During the next 24 hours you may add the following to the above:  · Hot cereal, plain toast, bread, rolls, and crackers  · Plain noodles, rice, mashed potatoes, and chicken noodle or rice soup  · Unsweetened canned fruit (but not pineapple) and bananas  Don't have more than 15 grams of fat a day. Do this by staying away from margarine, butter, oils, mayonnaise, sauces,  gravies, fried foods, peanut butter, meat, poultry, and fish.  Don't eat much fiber. Stay away from raw or cooked vegetables, fresh fruits (except bananas), and bran cereals.  Limit how much caffeine and chocolate you have. Do not use any spices or seasonings except salt.  During the next 24 hours  Gradually go back to your normal diet, as you feel better and your symptoms ease.  © 6112-5163 [employer]. [address] [place] [address], [place] [address]. All rights reserved. This information is not intended as a substitute for professional medical care. Always follow your healthcare professional's instructions.          Epigastric Pain (Uncertain Cause)    Epigastric pain can be a sign of disease in the upper abdomen. Common causes include:  · Acid reflux (stomach acid flowing up into the esophagus)  · Gastritis (irritation of the stomach lining)  · Peptic Ulcer Disease  · Inflammation of the pancreas  · Gallstone  · Infection in the gallbladder  Pain may be dull or burning. It may spread upward to the chest or to the back. There may be other symptoms such as belching, bloating, cramps or hunger pains. There may be weight loss or poor appetite, nausea or vomiting.  Since the diagnosis of your pain is not certain yet, further tests may sometimes be needed. Sometimes the doctor will treat you for the most likely condition to see if there is improvement before doing further tests.  Home care  Medicines  · Antacids help neutralize the normal acids in your stomach. Examples are Maalox, Mylanta, Rolaids, and Tums. If you dont like the liquid, you can also try a chewable one. You may find one works better than another for you. Overuse can cause diarrhea or constipation.  · Acid blockers (H2 blockers) decrease acid production. Examples are cimetidine (Tagamet), famotidine (Pepcid) and ranitidine (Zantac).  · Acid inhibitors (PPIs) decrease acid production in a different way than the blockers. You may find they work  better, but can take a little longer to take effect.  Examples are omeprazole (Prilosec), lansoprazole (Prevacid), pantoprazole (Protonix), rabeprazole (Aciphex), and esomeprazole (Nexium).  · Take an antacid 30-60 minutes after eating and at bedtime, but not at the same time as an acid blocker.  · Try not to take NSAIDs. Aspirin may also cause problems, but if taking it for your heart or other medical reasons, talk to your doctor before stopping it; you do not want to cause a worse problem, like a heart attack or stroke.  Diet  · If certain foods seem to cause your spasm, try to avoid them.   · Eat slowly and chew food well before swallowing. Symptoms of gastritis can be worsened by certain foods. Limit or avoid fatty, fried, and spicy foods, as well as coffee, chocolate, mint, and foods with high acid content such as tomatoes and citrus fruit and juices (orange, grapefruit, lemon).  · Avoid alcohol, caffeine, and tobacco, which can delay healing and worsen your problem.  · Try eating smaller meals with snacks in between  Follow-up care  Follow up with your healthcare provider or as advised.  When to seek medical advice  Call your healthcare provider right away if any of the following occur:  · Stomach pain worsens or moves to the right lower part of the abdomen  · Chest pain appears, or if it worsens or spreads to the chest, back, neck, shoulder, or arm  · Frequent vomiting (cant keep down liquids)  · Blood in the stool or vomit (red or black color)  · Feeling weak or dizzy, fainting, or having trouble breathing  · Fever of 100.4ºF (38ºC) or higher, or as directed by your healthcare provider  · Abdominal swelling              © 8752-5346 [employer]. [address] [place] [address], [place] [address]. All rights reserved. This information is not intended as a substitute for professional medical care. Always follow your healthcare professional's instructions.          [facility] Sign-Up     Activating your  [facility] account is as easy as 1-2-3!     1) Visit [email or web address], select Sign Up Now, enter this activation code and your date of birth, then select Next.  [email or web address]  Expires: 2/16/2017  7:38 AM      2) Create a username and password to use when you visit [facility] in the future and select a security question in case you lose your password and select Next.    3) Enter your e-mail address and click Sign Up!    Additional Information  If you have questions, please e-mail [email or web address] or call [phone] to talk to our [facility] staff. Remember, [facility] is NOT to be used for urgent needs. For medical emergencies, dial 911.          [facility] complies with applicable Federal civil rights laws and does not discriminate on the basis of race, color, national origin, age, disability, or sex.        Language Assistance Services     ATTENTION: Language assistance services are available, free of charge. Please call [phone].      ATENCIÓN: Si habla español, tiene a alvarenga disposición servicios gratuitos de asistencia lingüística. Llame al [phone].     LEONIDAS Ý: N?u b?n nói Ti?ng Vi?t, có các d?ch v? h? tr? ngôn ng? mi?n phí dành cho b?n. G?i s? [phone].

## 2017-01-02 NOTE — DISCHARGE INSTRUCTIONS
Diet for Vomiting or Diarrhea (Adult)    If your symptoms return or get worse after eating certain foods listed below, you should stop eating them until your symptoms ease and you feel better.  Once the vomiting stops, then follow the steps below.   During the first 12 to 24 hours  During the first 12 to 24 hours, follow this diet:  · Beverages. Plain water, sport drinks like electrolyte solutions, soft drinks without caffeine, mineral water (plain or flavored), clear fruit juices, and decaffeinated tea and coffee.  · Soups. Clear broth, consommé, and bouillon.  · Desserts. Plain gelatin, popsicles, and fruit juice bars. As you feel better, you may add 6 to 8 ounces of yogurt per day. If you have diarrhea, don't have foods or beverages that contain sugar, high-fructose corn syrup, or sugar alcohols.  During the next 24 hours  During the next 24 hours you may add the following to the above:  · Hot cereal, plain toast, bread, rolls, and crackers  · Plain noodles, rice, mashed potatoes, and chicken noodle or rice soup  · Unsweetened canned fruit (but not pineapple) and bananas  Don't have more than 15 grams of fat a day. Do this by staying away from margarine, butter, oils, mayonnaise, sauces, gravies, fried foods, peanut butter, meat, poultry, and fish.  Don't eat much fiber. Stay away from raw or cooked vegetables, fresh fruits (except bananas), and bran cereals.  Limit how much caffeine and chocolate you have. Do not use any spices or seasonings except salt.  During the next 24 hours  Gradually go back to your normal diet, as you feel better and your symptoms ease.  © 4829-4901 [employer]. [address] [place] [address], [place] [address]. All rights reserved. This information is not intended as a substitute for professional medical care. Always follow your healthcare professional's instructions.          Epigastric Pain (Uncertain Cause)    Epigastric pain can be a sign of disease in the upper abdomen.  Common causes include:  · Acid reflux (stomach acid flowing up into the esophagus)  · Gastritis (irritation of the stomach lining)  · Peptic Ulcer Disease  · Inflammation of the pancreas  · Gallstone  · Infection in the gallbladder  Pain may be dull or burning. It may spread upward to the chest or to the back. There may be other symptoms such as belching, bloating, cramps or hunger pains. There may be weight loss or poor appetite, nausea or vomiting.  Since the diagnosis of your pain is not certain yet, further tests may sometimes be needed. Sometimes the doctor will treat you for the most likely condition to see if there is improvement before doing further tests.  Home care  Medicines  · Antacids help neutralize the normal acids in your stomach. Examples are Maalox, Mylanta, Rolaids, and Tums. If you dont like the liquid, you can also try a chewable one. You may find one works better than another for you. Overuse can cause diarrhea or constipation.  · Acid blockers (H2 blockers) decrease acid production. Examples are cimetidine (Tagamet), famotidine (Pepcid) and ranitidine (Zantac).  · Acid inhibitors (PPIs) decrease acid production in a different way than the blockers. You may find they work better, but can take a little longer to take effect.  Examples are omeprazole (Prilosec), lansoprazole (Prevacid), pantoprazole (Protonix), rabeprazole (Aciphex), and esomeprazole (Nexium).  · Take an antacid 30-60 minutes after eating and at bedtime, but not at the same time as an acid blocker.  · Try not to take NSAIDs. Aspirin may also cause problems, but if taking it for your heart or other medical reasons, talk to your doctor before stopping it; you do not want to cause a worse problem, like a heart attack or stroke.  Diet  · If certain foods seem to cause your spasm, try to avoid them.   · Eat slowly and chew food well before swallowing. Symptoms of gastritis can be worsened by certain foods. Limit or avoid fatty,  fried, and spicy foods, as well as coffee, chocolate, mint, and foods with high acid content such as tomatoes and citrus fruit and juices (orange, grapefruit, lemon).  · Avoid alcohol, caffeine, and tobacco, which can delay healing and worsen your problem.  · Try eating smaller meals with snacks in between  Follow-up care  Follow up with your healthcare provider or as advised.  When to seek medical advice  Call your healthcare provider right away if any of the following occur:  · Stomach pain worsens or moves to the right lower part of the abdomen  · Chest pain appears, or if it worsens or spreads to the chest, back, neck, shoulder, or arm  · Frequent vomiting (cant keep down liquids)  · Blood in the stool or vomit (red or black color)  · Feeling weak or dizzy, fainting, or having trouble breathing  · Fever of 100.4ºF (38ºC) or higher, or as directed by your healthcare provider  · Abdominal swelling              © 7430-1762 [employer]. [address] [place] [address], [place] [address]. All rights reserved. This information is not intended as a substitute for professional medical care. Always follow your healthcare professional's instructions.

## 2017-01-02 NOTE — ANESTHESIA PREPROCEDURE EVALUATION
01/02/2017  Aldo Aguilar is a 36 y.o., male.    [facility] Anesthesia Evaluation    I have reviewed the Patient Summary Reports.    I have reviewed the Nursing Notes.      Review of Systems  Anesthesia Hx:  No problems with previous Anesthesia    Social:  Non-Smoker    Hematology/Oncology:  Hematology Normal   Oncology Normal     EENT/Dental:EENT/Dental Normal   Cardiovascular:  Cardiovascular Normal     Pulmonary:  Pulmonary Normal    Renal/:  Renal/ Normal     Hepatic/GI:   Acute cholecystitis    Musculoskeletal:  Musculoskeletal Normal    Neurological:  Neurology Normal    Endocrine:  Endocrine Normal    Dermatological:  Skin Normal    Psych:  Psychiatric Normal           Physical Exam  General:  Obesity    Airway/Jaw/Neck:  Airway Findings: Mouth Opening: Normal Tongue: Normal  General Airway Assessment: Adult  Mallampati: II  TM Distance: Normal, at least 6 cm        Eyes/Ears/Nose:  EYES/EARS/NOSE FINDINGS: Normal   Dental:  DENTAL FINDINGS: Normal   Chest/Lungs:  Chest/Lungs Findings: Clear to auscultation     Heart/Vascular:  Heart Findings: Rate: Normal  Rhythm: Regular Rhythm  Sounds: Normal  Heart murmur: negative Vascular Findings: Normal    Abdomen:  Abdomen Findings: Normal    Musculoskeletal:  Musculoskeletal Findings: Normal   Skin:  Skin Findings: Normal    Mental Status:  Mental Status Findings: Normal        Anesthesia Plan  Type of Anesthesia, risks & benefits discussed:  Anesthesia Type:  general  Patient's Preference:   Intra-op Monitoring Plan:   Intra-op Monitoring Plan Comments:   Post Op Pain Control Plan:   Post Op Pain Control Plan Comments:   Induction:   IV  Beta Blocker:  Patient is not currently on a Beta-Blocker (No further documentation required).       Informed Consent: Patient understands risks and agrees with Anesthesia plan.  Questions answered. Anesthesia consent signed  with patient.  ASA Score: 2     Day of Surgery Review of History & Physical:    H&P update referred to the surgeon.         Ready For Surgery From Anesthesia Perspective.

## 2017-01-02 NOTE — H&P
[facility]  History & Physical    Subjective:      Chief Complaint/Reason for Admission: Cholecystitis    Aldo Aguilar is a 36 y.o. male. One day history of upper abdominal pain associated with N/V. Denies fever or chills. No previous episodes. Pain a little better with pain meds. US consistent with cholelithiasis and cholecystitis. Labs are unremarkable.    History reviewed. No pertinent past medical history.  History reviewed. No pertinent past surgical history.  History reviewed. No pertinent family history.  Social History   Substance Use Topics    Smoking status: Never Smoker    Smokeless tobacco: None    Alcohol use Yes      Comment: occasionally         (Not in a hospital admission)  Review of patient's allergies indicates:  No Known Allergies     Review of Systems   Constitutional: Negative for chills and fever.   HENT: Negative for congestion and hearing loss.    Eyes: Negative for blurred vision and redness.   Respiratory: Negative for cough, sputum production, shortness of breath and stridor.    Cardiovascular: Negative for chest pain, palpitations and leg swelling.   Gastrointestinal: Positive for abdominal pain, nausea and vomiting. Negative for constipation and diarrhea.   Genitourinary: Negative for dysuria and hematuria.   Musculoskeletal: Negative for joint pain and myalgias.   Skin: Negative for rash.   Neurological: Negative for dizziness, speech change, loss of consciousness and headaches.   Endo/Heme/Allergies: Does not bruise/bleed easily.   Psychiatric/Behavioral: Negative for depression.       Objective:      Vital Signs (Most Recent)  Temp: 98 °F (36.7 °C) (01/02/17 0515)  Pulse: (!) 112 (01/02/17 1102)  Resp: 18 (01/02/17 0515)  BP: (!) 149/85 (01/02/17 1102)  SpO2: (!) 94 % (01/02/17 1102)    Vital Signs Range (Last 24H):  Temp:  [98 °F (36.7 °C)]   Pulse:  []   Resp:  [18]   BP: (132-149)/(76-88)   SpO2:  [93 %-100 %]     Physical Exam   Constitutional: He  is oriented to person, place, and time. He appears well-developed and well-nourished. No distress.   HENT:   Head: Normocephalic and atraumatic.   Mouth/Throat: Oropharynx is clear and moist. No oropharyngeal exudate.   Eyes: Conjunctivae and EOM are normal. Pupils are equal, round, and reactive to light. No scleral icterus.   Neck: Normal range of motion. Neck supple. No thyromegaly present.   Cardiovascular: Normal rate, regular rhythm, normal heart sounds and intact distal pulses.    No murmur heard.  Pulmonary/Chest: Effort normal and breath sounds normal. No respiratory distress. He has no wheezes. He has no rales.   Abdominal: Soft. Bowel sounds are normal. He exhibits no distension and no mass. There is tenderness (Upper abdominal tenderness.). There is no rebound and no guarding. No hernia.   Musculoskeletal: Normal range of motion. He exhibits no edema or tenderness.   Lymphadenopathy:     He has no cervical adenopathy.   Neurological: He is alert and oriented to person, place, and time. No cranial nerve deficit.   Skin: Skin is warm and dry. No rash noted. No erythema.   Psychiatric: He has a normal mood and affect. His behavior is normal.       Data Review:    CBC:   Lab Results   Component Value Date    WBC 8.20 01/02/2017    RBC 4.91 01/02/2017    HGB 14.9 01/02/2017    HCT 44.3 01/02/2017     01/02/2017     BMP:   Lab Results   Component Value Date     (H) 01/02/2017     01/02/2017    K 4.4 01/02/2017     01/02/2017    CO2 22 (L) 01/02/2017    BUN 14 01/02/2017    CREATININE 1.1 01/02/2017    CALCIUM 9.4 01/02/2017     Radiology review: CT and US reports and images reviewed.       Assessment:      1. Cholelithiasis.  2. Acute cholecystitis    Plan:    1. Plan laparoscopic, possible open cholecystectomy.  2. Risks and benefits of the planned procedure were discussed at length with the patient.  Risks and benefits of not proceeding with the procedure were discussed as well. All  questions were answered. The patient expressed clear understanding and would like to proceed with the procedure as discussed.

## 2017-01-03 VITALS
HEART RATE: 94 BPM | DIASTOLIC BLOOD PRESSURE: 74 MMHG | WEIGHT: 270 LBS | RESPIRATION RATE: 18 BRPM | SYSTOLIC BLOOD PRESSURE: 110 MMHG | BODY MASS INDEX: 38.65 KG/M2 | OXYGEN SATURATION: 96 % | TEMPERATURE: 98 F | HEIGHT: 70 IN

## 2017-01-03 PROCEDURE — 94799 UNLISTED PULMONARY SVC/PX: CPT

## 2017-01-03 PROCEDURE — 63600175 PHARM REV CODE 636 W HCPCS: Performed by: SURGERY

## 2017-01-03 PROCEDURE — C9113 INJ PANTOPRAZOLE SODIUM, VIA: HCPCS | Performed by: SURGERY

## 2017-01-03 PROCEDURE — 94761 N-INVAS EAR/PLS OXIMETRY MLT: CPT

## 2017-01-03 PROCEDURE — 25000003 PHARM REV CODE 250: Performed by: SURGERY

## 2017-01-03 RX ADMIN — PANTOPRAZOLE SODIUM 40 MG: 40 INJECTION, POWDER, FOR SOLUTION INTRAVENOUS at 06:01

## 2017-01-03 RX ADMIN — HYDROCODONE BITARTRATE AND ACETAMINOPHEN 1 TABLET: 5; 325 TABLET ORAL at 06:01

## 2017-01-03 RX ADMIN — SODIUM CHLORIDE: 0.9 INJECTION, SOLUTION INTRAVENOUS at 03:01

## 2017-01-03 NOTE — PROGRESS NOTES
Patient discharged home. Discharge instructions, follow up appointment, medications and lifting restrictions reviewed, no questions at this time. Patients IV removed in tact, Patient left floor via wheel chair, care relinquished at this time.

## 2017-01-03 NOTE — PROGRESS NOTES
Attempted to schedule pt's hospital for 2 weeks.  Plan for physicians's nurse to call pt to schedule.  Updated pt's nurse Zia

## 2017-01-03 NOTE — PLAN OF CARE
Problem: Patient Care Overview  Goal: Plan of Care Review  Outcome: Ongoing (interventions implemented as appropriate)  Pt aaox4. Pt slept soundly most of shift. No complaints of pain. Patient up to restroom independently throughout shift. Will continue to monitor.

## 2017-01-03 NOTE — PLAN OF CARE
Problem: Patient Care Overview  Goal: Plan of Care Review  Outcome: Ongoing (interventions implemented as appropriate)  Patient arrived on unit from OR. Patient aao x 4. Denies pain. Vitals stable.Tolerated regular diet. Ambulated to bathroom. Patient remained free from fall and injury.  Bed in lowest position and call light within reach.

## 2017-01-03 NOTE — PLAN OF CARE
01/03/17 0855   Final Note   Assessment Type Final Discharge Note   Discharge Disposition Home   Discharge planning education complete? Yes

## 2017-01-03 NOTE — DISCHARGE SUMMARY
Discharge Summary  General Surgery      Admit Date: 1/2/2017    Discharge Date :1/2/2017    Attending Physician: Dion Kelsey     Discharge Physician: Dion Kelsey    Discharged Condition: good    Discharge Diagnosis: Cholecystitis, acute [K81.0]    Treatments/Procedures: Procedure(s) (LRB):  CHOLECYSTECTOMY-LAPAROSCOPIC (N/A)    Hospital Course: Uneventful; Discharged home from [facility]    Significant Diagnostic Studies: none    Disposition: Home or Self Care    Diet: Regular    Follow up: Office 10-14 days    Activity: No heavy lifting till seen in office.    Patient Instructions:   Current Discharge Medication List      START taking these medications    Details   famotidine (PEPCID) 20 MG tablet Take 1 tablet (20 mg total) by mouth 2 (two) times daily.  Qty: 20 tablet, Refills: 0      !! hydrocodone-acetaminophen 7.5-325mg (NORCO) 7.5-325 mg per tablet Take 1 tablet by mouth every 4 (four) hours as needed for Pain.  Qty: 40 tablet, Refills: 0      !! hydrocodone-acetaminophen 7.5-325mg (NORCO) 7.5-325 mg per tablet Take 1 tablet by mouth every 4 (four) hours as needed for Pain.  Qty: 40 tablet, Refills: 0      ondansetron (ZOFRAN-ODT) 4 MG TbDL Take 1 tablet (4 mg total) by mouth every 8 (eight) hours as needed.  Qty: 12 tablet, Refills: 0      sucralfate (CARAFATE) 1 gram tablet Take 1 tablet (1 g total) by mouth 4 (four) times daily.  Qty: 20 tablet, Refills: 0       !! - Potential duplicate medications found. Please discuss with provider.            Discharge Procedure Orders  Diet general

## 2017-01-03 NOTE — PROGRESS NOTES
01/03/17 0813   Vitals   Pulse 94   Resp 18   SpO2 96 %   Pulse Oximetry Type Intermittent   All Fields Breath Sounds clear   Positioning HOB elevated 30 degrees   Incentive Spirometer   $ Administration (Incentive Spirometer) done with encouragement   Number of Repetitions (Incentive Spirometer) 10   Level (mL) (Incentive Spirometer) 2500   Post-Treatment   Post-treatment Heart Rate (beats/min) 88   Post-treatment Resp Rate (breaths/min) 18   All Fields Breath Sounds aeration increased        01/03/17 0813   Vitals   Pulse 94   Resp 18   SpO2 96 %   Pulse Oximetry Type Intermittent   All Fields Breath Sounds clear   Positioning HOB elevated 30 degrees   Incentive Spirometer   $ Administration (Incentive Spirometer) done with encouragement   Number of Repetitions (Incentive Spirometer) 10   Level (mL) (Incentive Spirometer) 2500   Post-Treatment   Post-treatment Heart Rate (beats/min) 88   Post-treatment Resp Rate (breaths/min) 18   All Fields Breath Sounds aeration increased     Patient encouraged to do deep breathing exercises.

## 2017-01-04 ENCOUNTER — TELEPHONE (OUTPATIENT)
Dept: SURGERY | Facility: CLINIC | Age: 37
End: 2017-01-04

## 2017-01-04 NOTE — TELEPHONE ENCOUNTER
----- Message from Magui Cabrera sent at 1/3/2017  8:52 AM CST -----  Patient is need to schedule  a 2 week post on on 01/17/17, doctor has no availability contact patient at [phone] to schedule.     Thank you

## 2017-01-26 ENCOUNTER — OFFICE VISIT (OUTPATIENT)
Dept: SURGERY | Facility: CLINIC | Age: 37
End: 2017-01-26
Payer: COMMERCIAL

## 2017-01-26 VITALS — TEMPERATURE: 98 F

## 2017-01-26 DIAGNOSIS — K81.9 CHOLECYSTITIS: Primary | ICD-10-CM

## 2017-01-26 PROCEDURE — 99999 PR PBB SHADOW E&M-EST. PATIENT-LVL II: CPT | Mod: PBBFAC,,, | Performed by: SURGERY

## 2017-01-26 PROCEDURE — 99024 POSTOP FOLLOW-UP VISIT: CPT | Mod: S$GLB,,, | Performed by: SURGERY

## 2017-01-26 NOTE — PROGRESS NOTES
POST-OP NOTE    DATE OF PROCEDURE: 1/2/17    PROCEDURE: Lap cholecystectomy    COMPLAINTS: None.    EXAM: Incision well approximated. No drainage. No infection.      IMPRESSION: Doing well.    PLAN: FU as needed.

## 2019-05-10 ENCOUNTER — OCCUPATIONAL HEALTH (OUTPATIENT)
Dept: URGENT CARE | Facility: CLINIC | Age: 39
End: 2019-05-10

## 2019-05-10 PROCEDURE — 80305 PR DRUG SCREEN - 1: ICD-10-PCS | Mod: S$GLB,,, | Performed by: EMERGENCY MEDICINE

## 2019-05-10 PROCEDURE — 80305 DRUG TEST PRSMV DIR OPT OBS: CPT | Mod: S$GLB,,, | Performed by: EMERGENCY MEDICINE

## 2020-08-04 ENCOUNTER — OFFICE VISIT (OUTPATIENT)
Dept: FAMILY MEDICINE | Facility: CLINIC | Age: 40
End: 2020-08-04
Payer: COMMERCIAL

## 2020-08-04 ENCOUNTER — LAB VISIT (OUTPATIENT)
Dept: LAB | Facility: HOSPITAL | Age: 40
End: 2020-08-04
Attending: FAMILY MEDICINE
Payer: COMMERCIAL

## 2020-08-04 VITALS
HEART RATE: 82 BPM | HEIGHT: 70 IN | BODY MASS INDEX: 41.6 KG/M2 | SYSTOLIC BLOOD PRESSURE: 122 MMHG | TEMPERATURE: 98 F | WEIGHT: 290.56 LBS | OXYGEN SATURATION: 97 % | DIASTOLIC BLOOD PRESSURE: 78 MMHG

## 2020-08-04 DIAGNOSIS — Z00.00 ENCOUNTER FOR WELL ADULT EXAM WITHOUT ABNORMAL FINDINGS: ICD-10-CM

## 2020-08-04 DIAGNOSIS — Z11.59 ENCOUNTER FOR HEPATITIS C SCREENING TEST FOR LOW RISK PATIENT: ICD-10-CM

## 2020-08-04 DIAGNOSIS — Z00.00 ENCOUNTER FOR WELL ADULT EXAM WITHOUT ABNORMAL FINDINGS: Primary | ICD-10-CM

## 2020-08-04 DIAGNOSIS — Z13.220 SCREENING FOR LIPID DISORDERS: ICD-10-CM

## 2020-08-04 DIAGNOSIS — Z11.4 SCREENING FOR HIV (HUMAN IMMUNODEFICIENCY VIRUS): ICD-10-CM

## 2020-08-04 DIAGNOSIS — E66.01 CLASS 3 SEVERE OBESITY WITH BODY MASS INDEX (BMI) OF 40.0 TO 44.9 IN ADULT, UNSPECIFIED OBESITY TYPE, UNSPECIFIED WHETHER SERIOUS COMORBIDITY PRESENT: ICD-10-CM

## 2020-08-04 PROBLEM — E66.813 CLASS 3 SEVERE OBESITY WITH BODY MASS INDEX (BMI) OF 40.0 TO 44.9 IN ADULT: Status: ACTIVE | Noted: 2020-08-04

## 2020-08-04 PROBLEM — K21.9 GERD (GASTROESOPHAGEAL REFLUX DISEASE): Status: ACTIVE | Noted: 2020-08-04

## 2020-08-04 LAB
BASOPHILS # BLD AUTO: 0.05 K/UL (ref 0–0.2)
BASOPHILS NFR BLD: 1 % (ref 0–1.9)
DIFFERENTIAL METHOD: NORMAL
EOSINOPHIL # BLD AUTO: 0.2 K/UL (ref 0–0.5)
EOSINOPHIL NFR BLD: 4.5 % (ref 0–8)
ERYTHROCYTE [DISTWIDTH] IN BLOOD BY AUTOMATED COUNT: 13.6 % (ref 11.5–14.5)
HCT VFR BLD AUTO: 46.8 % (ref 40–54)
HGB BLD-MCNC: 15 G/DL (ref 14–18)
IMM GRANULOCYTES # BLD AUTO: 0.01 K/UL (ref 0–0.04)
IMM GRANULOCYTES NFR BLD AUTO: 0.2 % (ref 0–0.5)
LYMPHOCYTES # BLD AUTO: 1.9 K/UL (ref 1–4.8)
LYMPHOCYTES NFR BLD: 39.4 % (ref 18–48)
MCH RBC QN AUTO: 30.2 PG (ref 27–31)
MCHC RBC AUTO-ENTMCNC: 32.1 G/DL (ref 32–36)
MCV RBC AUTO: 94 FL (ref 82–98)
MONOCYTES # BLD AUTO: 0.5 K/UL (ref 0.3–1)
MONOCYTES NFR BLD: 9.4 % (ref 4–15)
NEUTROPHILS # BLD AUTO: 2.2 K/UL (ref 1.8–7.7)
NEUTROPHILS NFR BLD: 45.5 % (ref 38–73)
NRBC BLD-RTO: 0 /100 WBC
PLATELET # BLD AUTO: 332 K/UL (ref 150–350)
PMV BLD AUTO: 9.7 FL (ref 9.2–12.9)
RBC # BLD AUTO: 4.97 M/UL (ref 4.6–6.2)
WBC # BLD AUTO: 4.9 K/UL (ref 3.9–12.7)

## 2020-08-04 PROCEDURE — 84443 ASSAY THYROID STIM HORMONE: CPT

## 2020-08-04 PROCEDURE — 90715 TDAP VACCINE 7 YRS/> IM: CPT | Mod: S$GLB,,, | Performed by: FAMILY MEDICINE

## 2020-08-04 PROCEDURE — 36415 COLL VENOUS BLD VENIPUNCTURE: CPT | Mod: PO

## 2020-08-04 PROCEDURE — 90471 TDAP VACCINE GREATER THAN OR EQUAL TO 7YO IM: ICD-10-PCS | Mod: S$GLB,,, | Performed by: FAMILY MEDICINE

## 2020-08-04 PROCEDURE — 90471 IMMUNIZATION ADMIN: CPT | Mod: S$GLB,,, | Performed by: FAMILY MEDICINE

## 2020-08-04 PROCEDURE — 99999 PR PBB SHADOW E&M-EST. PATIENT-LVL IV: CPT | Mod: PBBFAC,,, | Performed by: FAMILY MEDICINE

## 2020-08-04 PROCEDURE — 86803 HEPATITIS C AB TEST: CPT

## 2020-08-04 PROCEDURE — 90715 TDAP VACCINE GREATER THAN OR EQUAL TO 7YO IM: ICD-10-PCS | Mod: S$GLB,,, | Performed by: FAMILY MEDICINE

## 2020-08-04 PROCEDURE — 3008F PR BODY MASS INDEX (BMI) DOCUMENTED: ICD-10-PCS | Mod: CPTII,S$GLB,, | Performed by: FAMILY MEDICINE

## 2020-08-04 PROCEDURE — 80053 COMPREHEN METABOLIC PANEL: CPT

## 2020-08-04 PROCEDURE — 86703 HIV-1/HIV-2 1 RESULT ANTBDY: CPT

## 2020-08-04 PROCEDURE — 80061 LIPID PANEL: CPT

## 2020-08-04 PROCEDURE — 85025 COMPLETE CBC W/AUTO DIFF WBC: CPT

## 2020-08-04 PROCEDURE — 3008F BODY MASS INDEX DOCD: CPT | Mod: CPTII,S$GLB,, | Performed by: FAMILY MEDICINE

## 2020-08-04 PROCEDURE — 99203 PR OFFICE/OUTPT VISIT, NEW, LEVL III, 30-44 MIN: ICD-10-PCS | Mod: 25,S$GLB,, | Performed by: FAMILY MEDICINE

## 2020-08-04 PROCEDURE — 99999 PR PBB SHADOW E&M-EST. PATIENT-LVL IV: ICD-10-PCS | Mod: PBBFAC,,, | Performed by: FAMILY MEDICINE

## 2020-08-04 PROCEDURE — 99203 OFFICE O/P NEW LOW 30 MIN: CPT | Mod: 25,S$GLB,, | Performed by: FAMILY MEDICINE

## 2020-08-04 NOTE — PROGRESS NOTES
ID patient by name and date of birth.  Allergies confirmed. T-Dap given as per orders , using aseptic technique.  Patient tolerated well.  Information sheet reviewed and given to patient.

## 2020-08-04 NOTE — PATIENT INSTRUCTIONS
"  Lipid Panel  Does this test have other names?  Lipid profile, lipoprotein profile  What is this test?  This group of tests measures the amount of cholesterol and other fats in your blood.  Cholesterol and triglycerides are lipids, or fats. These fats are important for cell health, but they can be harmful when they build up in the blood. Sometimes they can lead to clogged, inflamed arteries, a condition call atherosclerosis. This may keep your heart from working normally.  This panel of tests helps predict your risk for heart disease and stroke.  A lipid panel measures these fats:  · Total cholesterol  · LDL ("bad") cholesterol  · HDL ("good") cholesterol  · Triglycerides, another type of fat that causes hardening of the arteries  Why do I need this test?  You may need this panel of tests if you have a family history of heart disease or stroke.  You may also have this test if your healthcare provider believes you're at risk for heart disease. These are risk factors:  · High blood pressure  · Diabetes or prediabetes  · Overweight or obesity  · Smoking  · Lack of exercise  · Diet of unhealthy foods  · Stress  · High total cholesterol  If you are already being treated for heart disease, you may have this test to see whether treatment is working.  What other tests might I have along with this test?  Your healthcare provider may also order other tests to look at how well your heart is working. These tests may include:  · Electrocardiogram, or ECG, which tests your heart's electrical impulses to see if it is beating normally  · Stress test, in which you may have to exercise while being monitored by ECG  · Echocardiogram, which uses sound waves to make pictures of your heart  · Cardiac catheterization. For this test, a healthcare provider puts a tube into your blood vessels and injects dye. X-rays are then done to look for clogs in the vessels.  Your provider may also order tests for high blood pressure or blood sugar, or " glucose.  What do my test results mean?  Many things may affect your lab test results. These include the method each lab uses to do the test. Even if your test results are different from the normal value, you may not have a problem. To learn what the results mean for you, talk with your healthcare provider.  Results are given in milligrams per deciliter (mg/dL). Here are the ranges for total cholesterol in adults:  · Normal: Less than 200 mg/dL  · Borderline high: 200 to 239 mg/dL  · High: At or above 240 mg/dL  These are the adult ranges for LDL cholesterol:  · Optimal: Less than 100 mg/dL (this is the goal for people with diabetes or heart disease)  · Near optimal: 100 to 129 mg/dL  · Borderline high: 130 to 159 mg/dL  · High: 160 to 189 mg/dL  · Very high: 190 mg/dL and higher  The above numbers are general guidelines, because actual goals depend on the number of risk factors you have for heart disease.  Your HDL cholesterol levels should be above 40 mg/dL. This type of fat is actually good for you because it lowers your risk of heart disease. The higher the number, the lower your risk. Sixty mg/dL or above is considered the level to protect you against heart disease.  · High levels of triglycerides are linked with a higher heart disease risk. Here are the adult ranges:  · Normal: Less than 150 mg/dL  · Borderline high: 150 to 199 mg/dL  · High: 200 to 499 mg/dL  · Very high: Above 500 mg/dL  Depending on your test results, your healthcare provider will decide whether you need lifestyle changes or medicines to lower your cholesterol.  Your results and targets will vary according to your age and health. If you have high blood pressure or diabetes, you're at higher risk of having heart disease. You may have to take medicine to get your cholesterol and triglyceride levels even lower.  How is this test done?  The test requires a blood sample, which is drawn through a needle from a vein in your arm.  Does this test  pose any risks?  Taking a blood sample with a needle carries risks that include bleeding, infection, bruising, or feeling dizzy. When the needle pricks your arm, you may feel a slight stinging sensation or pain. Afterward, the site may be slightly sore.  What might affect my test results?  Being sick or under stress, and taking certain medicines can affect your results.  What you eat, how often you exercise, and whether you smoke can also affect your lipid profile.  How do I prepare for the test?  You may need to not eat or drink anything but water for 12 to 14 hours before this test. In addition, be sure your healthcare provider knows about all medicines, herbs, vitamins, and supplements you are taking. This includes medicines that don't need a prescription and any illicit drugs you may use.      © 8720-7746 [employer], [employer]. [address] [place] [address], [place] [address]. All rights reserved. This information is not intended as a substitute for professional medical care. Always follow your healthcare professional's instructions.

## 2020-08-04 NOTE — PROGRESS NOTES
Subjective:       Patient ID: Aldo Aguilar is a 40 y.o. male.    Chief Complaint: Establish Care    Patient here to establish care.  Is requesting vaccine records for new employer however there are some confusion with a states system for his vaccine records.    He has no significant medical problems and is currently on no medications.  He is obese but states that he has begun exercising with cycling attempting to lose weight.  He is wanting annual screens done since he has turned 40.    Review of Systems   Constitutional: Negative for activity change, appetite change, chills and fever.   HENT: Negative for nasal congestion, ear discharge, ear pain and sinus pressure/congestion.    Eyes: Negative for pain and itching.   Respiratory: Negative for chest tightness and shortness of breath.    Cardiovascular: Negative for chest pain and palpitations.   Gastrointestinal: Negative for abdominal pain, constipation, nausea and vomiting.   Endocrine: Negative for cold intolerance and heat intolerance.   Musculoskeletal: Negative for arthralgias, joint swelling and myalgias.   Integumentary:  Negative for color change and rash.   Neurological: Negative for dizziness, weakness and headaches.   Psychiatric/Behavioral: Negative for agitation, behavioral problems and confusion.         Objective:      Physical Exam  Vitals signs and nursing note reviewed.   Constitutional:       Appearance: He is well-developed.   HENT:      Head: Normocephalic and atraumatic.   Eyes:      Pupils: Pupils are equal, round, and reactive to light.   Neck:      Musculoskeletal: Normal range of motion and neck supple.   Cardiovascular:      Rate and Rhythm: Normal rate and regular rhythm.      Heart sounds: No murmur.   Pulmonary:      Effort: Pulmonary effort is normal. No respiratory distress.      Breath sounds: Normal breath sounds. No wheezing or rales.   Abdominal:      General: There is no distension.      Palpations: Abdomen is soft.       Tenderness: There is no abdominal tenderness. There is no guarding.   Musculoskeletal: Normal range of motion.   Skin:     General: Skin is warm and dry.   Neurological:      Mental Status: He is alert and oriented to person, place, and time.      Deep Tendon Reflexes: Reflexes normal.   Psychiatric:         Behavior: Behavior normal.         Thought Content: Thought content normal.         Judgment: Judgment normal.         Assessment:       1. Encounter for well adult exam without abnormal findings    2. Gastroesophageal reflux disease without esophagitis    3. Screening for HIV (human immunodeficiency virus)    4. Encounter for hepatitis C screening test for low risk patient    5. Screening for lipid disorders    6. Class 3 severe obesity with body mass index (BMI) of 40.0 to 44.9 in adult, unspecified obesity type, unspecified whether serious comorbidity present        Plan:       Orders Placed This Encounter   Procedures    Tdap Vaccine    CBC auto differential     Standing Status:   Future     Number of Occurrences:   1     Standing Expiration Date:   9/4/2020    Comprehensive metabolic panel     Standing Status:   Future     Number of Occurrences:   1     Standing Expiration Date:   9/4/2020    Lipid Panel     Standing Status:   Future     Number of Occurrences:   1     Standing Expiration Date:   9/4/2020    TSH     Standing Status:   Future     Number of Occurrences:   1     Standing Expiration Date:   9/4/2020    Hepatitis C Antibody     Standing Status:   Future     Number of Occurrences:   1     Standing Expiration Date:   10/4/2021    HIV 1/2 Ag/Ab (4th Gen)     Standing Status:   Future     Number of Occurrences:   1     Standing Expiration Date:   10/4/2021     -fasting labs ordered as above  -patient due for Tdap booster  -patient will need to contact state record system will his own family to more accurately get vaccine record.  -will follow-up annually or on a as needed basis

## 2020-08-05 LAB
ALBUMIN SERPL BCP-MCNC: 3.8 G/DL (ref 3.5–5.2)
ALP SERPL-CCNC: 71 U/L (ref 55–135)
ALT SERPL W/O P-5'-P-CCNC: 41 U/L (ref 10–44)
ANION GAP SERPL CALC-SCNC: 10 MMOL/L (ref 8–16)
AST SERPL-CCNC: 36 U/L (ref 10–40)
BILIRUB SERPL-MCNC: 0.7 MG/DL (ref 0.1–1)
BUN SERPL-MCNC: 12 MG/DL (ref 6–20)
CALCIUM SERPL-MCNC: 9.1 MG/DL (ref 8.7–10.5)
CHLORIDE SERPL-SCNC: 106 MMOL/L (ref 95–110)
CHOLEST SERPL-MCNC: 178 MG/DL (ref 120–199)
CHOLEST/HDLC SERPL: 2.8 {RATIO} (ref 2–5)
CO2 SERPL-SCNC: 23 MMOL/L (ref 23–29)
CREAT SERPL-MCNC: 1.1 MG/DL (ref 0.5–1.4)
EST. GFR  (AFRICAN AMERICAN): >60 ML/MIN/1.73 M^2
EST. GFR  (NON AFRICAN AMERICAN): >60 ML/MIN/1.73 M^2
GLUCOSE SERPL-MCNC: 80 MG/DL (ref 70–110)
HCV AB SERPL QL IA: NEGATIVE
HDLC SERPL-MCNC: 63 MG/DL (ref 40–75)
HDLC SERPL: 35.4 % (ref 20–50)
LDLC SERPL CALC-MCNC: 101.4 MG/DL (ref 63–159)
NONHDLC SERPL-MCNC: 115 MG/DL
POTASSIUM SERPL-SCNC: 4.5 MMOL/L (ref 3.5–5.1)
PROT SERPL-MCNC: 7.9 G/DL (ref 6–8.4)
SODIUM SERPL-SCNC: 139 MMOL/L (ref 136–145)
TRIGL SERPL-MCNC: 68 MG/DL (ref 30–150)
TSH SERPL DL<=0.005 MIU/L-ACNC: 0.99 UIU/ML (ref 0.4–4)

## 2020-08-06 LAB — HIV 1+2 AB+HIV1 P24 AG SERPL QL IA: NEGATIVE

## 2020-09-03 ENCOUNTER — TELEPHONE (OUTPATIENT)
Dept: FAMILY MEDICINE | Facility: CLINIC | Age: 40
End: 2020-09-03

## 2020-09-03 DIAGNOSIS — Z02.9 ADMINISTRATIVE ENCOUNTER: Primary | ICD-10-CM

## 2020-09-03 NOTE — TELEPHONE ENCOUNTER
----- Message from Chavez Nolasco sent at 9/3/2020 11:29 AM CDT -----  Type: Needs Medical Advice  Who Called:  pt    Best Call Back Number: [phone]    Additional Information: pt is travelling to [place] for vacation and is told he needs a covid test from a certified [facility] and is being told only [facility] are the only ones close to the pt that are accepted. Pt is needing orders so that he may be tested. Pt is scheduled for flight on 9.14.20 and needing this prior to flight. Please call to advise

## 2020-09-03 NOTE — TELEPHONE ENCOUNTER
----- Message from Maura Eller sent at 9/3/2020 10:54 AM CDT -----  Regarding: Pts wife Mrs. Aguilar Mobile# [phone]  Ms. Aguilar would like to put in an order for a COVID-19 test for her  please. Patient need to have this test done for travel reasons.

## 2020-09-03 NOTE — TELEPHONE ENCOUNTER
Spoke with patient regarding his call, patient is not having any symptoms, he was [facility] since he just needed to be tested to travel.

## 2020-09-08 ENCOUNTER — LAB VISIT (OUTPATIENT)
Dept: PRIMARY CARE CLINIC | Facility: CLINIC | Age: 40
End: 2020-09-08
Payer: COMMERCIAL

## 2020-09-08 DIAGNOSIS — Z02.9 ADMINISTRATIVE ENCOUNTER: ICD-10-CM

## 2020-09-08 PROCEDURE — U0003 INFECTIOUS AGENT DETECTION BY NUCLEIC ACID (DNA OR RNA); SEVERE ACUTE RESPIRATORY SYNDROME CORONAVIRUS 2 (SARS-COV-2) (CORONAVIRUS DISEASE [COVID-19]), AMPLIFIED PROBE TECHNIQUE, MAKING USE OF HIGH THROUGHPUT TECHNOLOGIES AS DESCRIBED BY CMS-2020-01-R: HCPCS

## 2020-09-09 LAB — SARS-COV-2 RNA RESP QL NAA+PROBE: NOT DETECTED

## 2020-09-10 ENCOUNTER — TELEPHONE (OUTPATIENT)
Dept: FAMILY MEDICINE | Facility: CLINIC | Age: 40
End: 2020-09-10

## 2020-09-10 NOTE — TELEPHONE ENCOUNTER
----- Message from Cierra Valenzuela sent at 9/10/2020  8:35 AM CDT -----  Regarding: covid paperwork  Contact: Patient  Patient want to know if he can  covid-test paperwork today for travel please call back at [phone] (home)

## 2020-09-11 NOTE — TELEPHONE ENCOUNTER
Routed to Dr. Bay in a different encounter.     ----- Message from Donita Yepez sent at 9/11/2020  8:37 AM CDT -----  Regarding: Pt Message  Type: Needs Medical Advice  Who Called:  PT  Best Call Back Number: [phone]  Additional Information: patient is requesting a call back in regards to his covid test results and letter for travel ready for pickup. Patient had test done Tuesday at [facility] in [place]. Please and thank you

## 2020-09-14 ENCOUNTER — TELEPHONE (OUTPATIENT)
Dept: FAMILY MEDICINE | Facility: CLINIC | Age: 40
End: 2020-09-14

## 2020-09-14 NOTE — TELEPHONE ENCOUNTER
----- Message from Citlaly Gomez sent at 9/12/2020  8:31 AM CDT -----  Regarding: checking the status of letter  Contact: BRICE ARMAS [[id number]]  Patient is requesting a call back from the nurse checking the status of the letter (covid), he need this to information to travel.  This information was needed by 9/11/2020.    Please call the patient upon request at phone number [phone].

## 2021-05-10 ENCOUNTER — PATIENT MESSAGE (OUTPATIENT)
Dept: RESEARCH | Facility: HOSPITAL | Age: 41
End: 2021-05-10

## 2021-08-18 ENCOUNTER — HOSPITAL ENCOUNTER (EMERGENCY)
Facility: HOSPITAL | Age: 41
Discharge: HOME | End: 2021-08-18
Attending: EMERGENCY MEDICINE
Payer: OTHER MISCELLANEOUS

## 2021-08-18 VITALS
RESPIRATION RATE: 16 BRPM | DIASTOLIC BLOOD PRESSURE: 85 MMHG | HEART RATE: 68 BPM | OXYGEN SATURATION: 99 % | BODY MASS INDEX: 38.65 KG/M2 | HEIGHT: 70 IN | TEMPERATURE: 99 F | SYSTOLIC BLOOD PRESSURE: 174 MMHG | WEIGHT: 270 LBS

## 2021-08-18 DIAGNOSIS — S62.621B OPEN DISPLACED FRACTURE OF MIDDLE PHALANX OF LEFT INDEX FINGER, INITIAL ENCOUNTER: Primary | ICD-10-CM

## 2021-08-18 DIAGNOSIS — S61.211A LACERATION OF LEFT INDEX FINGER WITHOUT FOREIGN BODY WITHOUT DAMAGE TO NAIL, INITIAL ENCOUNTER: ICD-10-CM

## 2021-08-18 PROBLEM — S56.522A: Status: ACTIVE | Noted: 2021-08-18

## 2021-08-18 PROBLEM — S51.802A: Status: ACTIVE | Noted: 2021-08-18

## 2021-08-18 PROCEDURE — 96374 THER/PROPH/DIAG INJ IV PUSH: CPT | Mod: 59

## 2021-08-18 PROCEDURE — 12002 RPR S/N/AX/GEN/TRNK2.6-7.5CM: CPT

## 2021-08-18 PROCEDURE — 99284 PR EMERGENCY DEPT VISIT,LEVEL IV: ICD-10-PCS | Mod: ,,, | Performed by: EMERGENCY MEDICINE

## 2021-08-18 PROCEDURE — 99284 EMERGENCY DEPT VISIT MOD MDM: CPT | Mod: 25

## 2021-08-18 PROCEDURE — 99284 EMERGENCY DEPT VISIT MOD MDM: CPT | Mod: ,,, | Performed by: EMERGENCY MEDICINE

## 2021-08-18 PROCEDURE — 63600175 PHARM REV CODE 636 W HCPCS: Performed by: STUDENT IN AN ORGANIZED HEALTH CARE EDUCATION/TRAINING PROGRAM

## 2021-08-18 RX ORDER — LIDOCAINE HYDROCHLORIDE 10 MG/ML
20 INJECTION INFILTRATION; PERINEURAL ONCE
Status: DISCONTINUED | OUTPATIENT
Start: 2021-08-18 | End: 2021-08-18 | Stop reason: HOSPADM

## 2021-08-18 RX ORDER — CEFAZOLIN SODIUM 1 G/3ML
2 INJECTION, POWDER, FOR SOLUTION INTRAMUSCULAR; INTRAVENOUS
Status: DISCONTINUED | OUTPATIENT
Start: 2021-08-18 | End: 2021-08-18 | Stop reason: HOSPADM

## 2021-08-18 RX ORDER — SULFAMETHOXAZOLE AND TRIMETHOPRIM 800; 160 MG/1; MG/1
1 TABLET ORAL 2 TIMES DAILY
Qty: 28 TABLET | Refills: 0 | Status: SHIPPED | OUTPATIENT
Start: 2021-08-18 | End: 2021-09-01

## 2021-08-18 RX ADMIN — CEFAZOLIN 2 G: 330 INJECTION, POWDER, FOR SOLUTION INTRAMUSCULAR; INTRAVENOUS at 07:08

## 2021-08-20 ENCOUNTER — TELEPHONE (OUTPATIENT)
Dept: URGENT CARE | Facility: CLINIC | Age: 41
End: 2021-08-20

## 2021-08-24 ENCOUNTER — OFFICE VISIT (OUTPATIENT)
Dept: URGENT CARE | Facility: CLINIC | Age: 41
End: 2021-08-24
Payer: OTHER MISCELLANEOUS

## 2021-08-24 VITALS
HEART RATE: 93 BPM | TEMPERATURE: 99 F | RESPIRATION RATE: 20 BRPM | WEIGHT: 270 LBS | DIASTOLIC BLOOD PRESSURE: 84 MMHG | BODY MASS INDEX: 38.74 KG/M2 | OXYGEN SATURATION: 96 % | SYSTOLIC BLOOD PRESSURE: 132 MMHG

## 2021-08-24 DIAGNOSIS — S51.802A EXTENSOR TENDON LACERATION, FOREARM, OPEN WOUND, LEFT, INITIAL ENCOUNTER: Primary | ICD-10-CM

## 2021-08-24 DIAGNOSIS — S56.522A EXTENSOR TENDON LACERATION, FOREARM, OPEN WOUND, LEFT, INITIAL ENCOUNTER: Primary | ICD-10-CM

## 2021-08-24 PROCEDURE — 99203 PR OFFICE/OUTPT VISIT, NEW, LEVL III, 30-44 MIN: ICD-10-PCS | Mod: S$GLB,,, | Performed by: FAMILY MEDICINE

## 2021-08-24 PROCEDURE — 99203 OFFICE O/P NEW LOW 30 MIN: CPT | Mod: S$GLB,,, | Performed by: FAMILY MEDICINE

## 2021-08-27 ENCOUNTER — OFFICE VISIT (OUTPATIENT)
Dept: URGENT CARE | Facility: CLINIC | Age: 41
End: 2021-08-27
Payer: OTHER MISCELLANEOUS

## 2021-08-27 VITALS
RESPIRATION RATE: 16 BRPM | BODY MASS INDEX: 38.65 KG/M2 | WEIGHT: 270 LBS | HEIGHT: 70 IN | OXYGEN SATURATION: 97 % | SYSTOLIC BLOOD PRESSURE: 125 MMHG | TEMPERATURE: 98 F | DIASTOLIC BLOOD PRESSURE: 78 MMHG | HEART RATE: 78 BPM

## 2021-08-27 DIAGNOSIS — S61.209D EXTENSOR TENDON LACERATION, FINGER, OPEN WOUND, SUBSEQUENT ENCOUNTER: Primary | ICD-10-CM

## 2021-08-27 DIAGNOSIS — S56.429D EXTENSOR TENDON LACERATION, FINGER, OPEN WOUND, SUBSEQUENT ENCOUNTER: Primary | ICD-10-CM

## 2021-08-27 DIAGNOSIS — S62.623D OPEN DISPLACED FRACTURE OF MIDDLE PHALANX OF LEFT MIDDLE FINGER WITH ROUTINE HEALING, SUBSEQUENT ENCOUNTER: ICD-10-CM

## 2021-08-27 PROCEDURE — 99214 OFFICE O/P EST MOD 30 MIN: CPT | Mod: S$GLB,,, | Performed by: FAMILY MEDICINE

## 2021-08-27 PROCEDURE — 99214 PR OFFICE/OUTPT VISIT, EST, LEVL IV, 30-39 MIN: ICD-10-PCS | Mod: S$GLB,,, | Performed by: FAMILY MEDICINE

## 2021-09-03 ENCOUNTER — TELEPHONE (OUTPATIENT)
Dept: ORTHOPEDICS | Facility: CLINIC | Age: 41
End: 2021-09-03

## 2021-09-07 ENCOUNTER — OFFICE VISIT (OUTPATIENT)
Dept: ORTHOPEDICS | Facility: CLINIC | Age: 41
End: 2021-09-07
Payer: COMMERCIAL

## 2021-09-07 ENCOUNTER — HOSPITAL ENCOUNTER (OUTPATIENT)
Dept: RADIOLOGY | Facility: HOSPITAL | Age: 41
Discharge: HOME | End: 2021-09-07
Attending: ORTHOPAEDIC SURGERY
Payer: COMMERCIAL

## 2021-09-07 VITALS — BODY MASS INDEX: 38.65 KG/M2 | HEIGHT: 70 IN | WEIGHT: 270 LBS

## 2021-09-07 DIAGNOSIS — S62.621A CLOSED DISPLACED FRACTURE OF MIDDLE PHALANX OF LEFT INDEX FINGER, INITIAL ENCOUNTER: ICD-10-CM

## 2021-09-07 DIAGNOSIS — S62.621A CLOSED DISPLACED FRACTURE OF MIDDLE PHALANX OF LEFT INDEX FINGER, INITIAL ENCOUNTER: Primary | ICD-10-CM

## 2021-09-07 DIAGNOSIS — S61.211A LACERATION OF LEFT INDEX FINGER WITHOUT FOREIGN BODY WITHOUT DAMAGE TO NAIL, INITIAL ENCOUNTER: ICD-10-CM

## 2021-09-07 DIAGNOSIS — M20.019 MALLET DEFORMITY OF INDEX FINGER: ICD-10-CM

## 2021-09-07 PROCEDURE — 73140 X-RAY EXAM OF FINGER(S): CPT | Mod: 26,LT,, | Performed by: RADIOLOGY

## 2021-09-07 PROCEDURE — 73140 XR FINGER 2 OR MORE VIEWS: ICD-10-PCS | Mod: 26,LT,, | Performed by: RADIOLOGY

## 2021-09-07 PROCEDURE — 99999 PR PBB SHADOW E&M-EST. PATIENT-LVL III: CPT | Mod: PBBFAC,,, | Performed by: ORTHOPAEDIC SURGERY

## 2021-09-07 PROCEDURE — 99999 PR PBB SHADOW E&M-EST. PATIENT-LVL III: ICD-10-PCS | Mod: PBBFAC,,, | Performed by: ORTHOPAEDIC SURGERY

## 2021-09-07 PROCEDURE — 99204 OFFICE O/P NEW MOD 45 MIN: CPT | Mod: S$GLB,,, | Performed by: ORTHOPAEDIC SURGERY

## 2021-09-07 PROCEDURE — 73140 X-RAY EXAM OF FINGER(S): CPT | Mod: TC,PO

## 2021-09-07 PROCEDURE — 99204 PR OFFICE/OUTPT VISIT, NEW, LEVL IV, 45-59 MIN: ICD-10-PCS | Mod: S$GLB,,, | Performed by: ORTHOPAEDIC SURGERY

## 2021-09-14 DIAGNOSIS — S62.621A CLOSED DISPLACED FRACTURE OF MIDDLE PHALANX OF LEFT INDEX FINGER, INITIAL ENCOUNTER: Primary | ICD-10-CM

## 2021-09-22 ENCOUNTER — OFFICE VISIT (OUTPATIENT)
Dept: ORTHOPEDICS | Facility: CLINIC | Age: 41
End: 2021-09-22
Payer: OTHER MISCELLANEOUS

## 2021-09-22 ENCOUNTER — HOSPITAL ENCOUNTER (OUTPATIENT)
Dept: RADIOLOGY | Facility: HOSPITAL | Age: 41
Discharge: HOME | End: 2021-09-22
Attending: ORTHOPAEDIC SURGERY
Payer: OTHER MISCELLANEOUS

## 2021-09-22 VITALS — BODY MASS INDEX: 38.65 KG/M2 | WEIGHT: 270 LBS | HEIGHT: 70 IN

## 2021-09-22 DIAGNOSIS — M20.019 MALLET DEFORMITY OF INDEX FINGER: ICD-10-CM

## 2021-09-22 DIAGNOSIS — S62.621A CLOSED DISPLACED FRACTURE OF MIDDLE PHALANX OF LEFT INDEX FINGER, INITIAL ENCOUNTER: ICD-10-CM

## 2021-09-22 DIAGNOSIS — S62.621B OPEN DISPLACED FRACTURE OF MIDDLE PHALANX OF LEFT INDEX FINGER, INITIAL ENCOUNTER: ICD-10-CM

## 2021-09-22 DIAGNOSIS — S61.211A LACERATION OF LEFT INDEX FINGER WITHOUT FOREIGN BODY WITHOUT DAMAGE TO NAIL, INITIAL ENCOUNTER: Primary | ICD-10-CM

## 2021-09-22 PROCEDURE — 99999 PR PBB SHADOW E&M-EST. PATIENT-LVL III: CPT | Mod: PBBFAC,,, | Performed by: ORTHOPAEDIC SURGERY

## 2021-09-22 PROCEDURE — 73140 X-RAY EXAM OF FINGER(S): CPT | Mod: TC,PO,LT

## 2021-09-22 PROCEDURE — 99213 PR OFFICE/OUTPT VISIT, EST, LEVL III, 20-29 MIN: ICD-10-PCS | Mod: S$GLB,,, | Performed by: ORTHOPAEDIC SURGERY

## 2021-09-22 PROCEDURE — 99999 PR PBB SHADOW E&M-EST. PATIENT-LVL III: ICD-10-PCS | Mod: PBBFAC,,, | Performed by: ORTHOPAEDIC SURGERY

## 2021-09-22 PROCEDURE — 73140 X-RAY EXAM OF FINGER(S): CPT | Mod: 26,LT,, | Performed by: RADIOLOGY

## 2021-09-22 PROCEDURE — 99213 OFFICE O/P EST LOW 20 MIN: CPT | Mod: S$GLB,,, | Performed by: ORTHOPAEDIC SURGERY

## 2021-09-22 PROCEDURE — 73140 XR FINGER 2 OR MORE VIEWS LEFT: ICD-10-PCS | Mod: 26,LT,, | Performed by: RADIOLOGY

## 2021-09-28 DIAGNOSIS — S62.621B OPEN DISPLACED FRACTURE OF MIDDLE PHALANX OF LEFT INDEX FINGER, INITIAL ENCOUNTER: Primary | ICD-10-CM

## 2021-10-04 ENCOUNTER — HOSPITAL ENCOUNTER (OUTPATIENT)
Dept: RADIOLOGY | Facility: HOSPITAL | Age: 41
Discharge: HOME | End: 2021-10-04
Attending: ORTHOPAEDIC SURGERY
Payer: OTHER MISCELLANEOUS

## 2021-10-04 ENCOUNTER — OFFICE VISIT (OUTPATIENT)
Dept: ORTHOPEDICS | Facility: CLINIC | Age: 41
End: 2021-10-04
Payer: OTHER MISCELLANEOUS

## 2021-10-04 VITALS
SYSTOLIC BLOOD PRESSURE: 137 MMHG | DIASTOLIC BLOOD PRESSURE: 93 MMHG | WEIGHT: 270 LBS | BODY MASS INDEX: 38.65 KG/M2 | HEART RATE: 74 BPM | HEIGHT: 70 IN

## 2021-10-04 DIAGNOSIS — S62.621B OPEN DISPLACED FRACTURE OF MIDDLE PHALANX OF LEFT INDEX FINGER, INITIAL ENCOUNTER: ICD-10-CM

## 2021-10-04 DIAGNOSIS — S62.621D CLOSED DISPLACED FRACTURE OF MIDDLE PHALANX OF LEFT INDEX FINGER WITH ROUTINE HEALING, SUBSEQUENT ENCOUNTER: ICD-10-CM

## 2021-10-04 DIAGNOSIS — M20.019 MALLET DEFORMITY OF INDEX FINGER: Primary | ICD-10-CM

## 2021-10-04 DIAGNOSIS — S61.211A LACERATION OF LEFT INDEX FINGER WITHOUT FOREIGN BODY WITHOUT DAMAGE TO NAIL, INITIAL ENCOUNTER: ICD-10-CM

## 2021-10-04 PROCEDURE — 73140 XR FINGER 2 OR MORE VIEWS LEFT: ICD-10-PCS | Mod: 26,LT,, | Performed by: RADIOLOGY

## 2021-10-04 PROCEDURE — 99999 PR PBB SHADOW E&M-EST. PATIENT-LVL III: CPT | Mod: PBBFAC,,, | Performed by: ORTHOPAEDIC SURGERY

## 2021-10-04 PROCEDURE — 99213 PR OFFICE/OUTPT VISIT, EST, LEVL III, 20-29 MIN: ICD-10-PCS | Mod: S$GLB,,, | Performed by: ORTHOPAEDIC SURGERY

## 2021-10-04 PROCEDURE — 99213 OFFICE O/P EST LOW 20 MIN: CPT | Mod: S$GLB,,, | Performed by: ORTHOPAEDIC SURGERY

## 2021-10-04 PROCEDURE — 73140 X-RAY EXAM OF FINGER(S): CPT | Mod: TC,PO,LT

## 2021-10-04 PROCEDURE — 73140 X-RAY EXAM OF FINGER(S): CPT | Mod: 26,LT,, | Performed by: RADIOLOGY

## 2021-10-04 PROCEDURE — 99999 PR PBB SHADOW E&M-EST. PATIENT-LVL III: ICD-10-PCS | Mod: PBBFAC,,, | Performed by: ORTHOPAEDIC SURGERY

## 2021-10-07 ENCOUNTER — PATIENT MESSAGE (OUTPATIENT)
Dept: ADMINISTRATIVE | Facility: HOSPITAL | Age: 41
End: 2021-10-07

## 2021-10-19 DIAGNOSIS — M20.019 MALLET DEFORMITY OF INDEX FINGER: Primary | ICD-10-CM

## 2021-10-25 ENCOUNTER — OFFICE VISIT (OUTPATIENT)
Dept: ORTHOPEDICS | Facility: CLINIC | Age: 41
End: 2021-10-25
Payer: OTHER MISCELLANEOUS

## 2021-10-25 ENCOUNTER — HOSPITAL ENCOUNTER (OUTPATIENT)
Dept: RADIOLOGY | Facility: HOSPITAL | Age: 41
Discharge: HOME | End: 2021-10-25
Attending: ORTHOPAEDIC SURGERY
Payer: OTHER MISCELLANEOUS

## 2021-10-25 VITALS
SYSTOLIC BLOOD PRESSURE: 135 MMHG | BODY MASS INDEX: 38.65 KG/M2 | WEIGHT: 270 LBS | HEART RATE: 70 BPM | HEIGHT: 70 IN | DIASTOLIC BLOOD PRESSURE: 89 MMHG

## 2021-10-25 DIAGNOSIS — S61.311D LACERATION OF LEFT INDEX FINGER WITHOUT FOREIGN BODY WITH DAMAGE TO NAIL, SUBSEQUENT ENCOUNTER: Primary | ICD-10-CM

## 2021-10-25 DIAGNOSIS — M20.019 MALLET DEFORMITY OF INDEX FINGER: ICD-10-CM

## 2021-10-25 DIAGNOSIS — S62.621D CLOSED DISPLACED FRACTURE OF MIDDLE PHALANX OF LEFT INDEX FINGER WITH ROUTINE HEALING, SUBSEQUENT ENCOUNTER: ICD-10-CM

## 2021-10-25 PROCEDURE — 99213 OFFICE O/P EST LOW 20 MIN: CPT | Mod: S$GLB,,, | Performed by: ORTHOPAEDIC SURGERY

## 2021-10-25 PROCEDURE — 73140 X-RAY EXAM OF FINGER(S): CPT | Mod: 26,LT,, | Performed by: RADIOLOGY

## 2021-10-25 PROCEDURE — 99999 PR PBB SHADOW E&M-EST. PATIENT-LVL III: ICD-10-PCS | Mod: PBBFAC,,, | Performed by: ORTHOPAEDIC SURGERY

## 2021-10-25 PROCEDURE — 99213 PR OFFICE/OUTPT VISIT, EST, LEVL III, 20-29 MIN: ICD-10-PCS | Mod: S$GLB,,, | Performed by: ORTHOPAEDIC SURGERY

## 2021-10-25 PROCEDURE — 73140 XR FINGER 2 OR MORE VIEWS LEFT: ICD-10-PCS | Mod: 26,LT,, | Performed by: RADIOLOGY

## 2021-10-25 PROCEDURE — 99999 PR PBB SHADOW E&M-EST. PATIENT-LVL III: CPT | Mod: PBBFAC,,, | Performed by: ORTHOPAEDIC SURGERY

## 2021-10-25 PROCEDURE — 73140 X-RAY EXAM OF FINGER(S): CPT | Mod: TC,PO,LT

## 2021-11-03 ENCOUNTER — CLINICAL SUPPORT (OUTPATIENT)
Dept: REHABILITATION | Facility: HOSPITAL | Age: 41
End: 2021-11-03
Payer: OTHER MISCELLANEOUS

## 2021-11-03 DIAGNOSIS — M20.019 MALLET DEFORMITY OF INDEX FINGER: ICD-10-CM

## 2021-11-03 DIAGNOSIS — S61.311D LACERATION OF LEFT INDEX FINGER WITHOUT FOREIGN BODY WITH DAMAGE TO NAIL, SUBSEQUENT ENCOUNTER: ICD-10-CM

## 2021-11-03 DIAGNOSIS — S62.621D CLOSED DISPLACED FRACTURE OF MIDDLE PHALANX OF LEFT INDEX FINGER WITH ROUTINE HEALING, SUBSEQUENT ENCOUNTER: Primary | ICD-10-CM

## 2021-11-03 PROCEDURE — 97165 OT EVAL LOW COMPLEX 30 MIN: CPT

## 2021-11-03 PROCEDURE — 97110 THERAPEUTIC EXERCISES: CPT

## 2021-11-05 ENCOUNTER — CLINICAL SUPPORT (OUTPATIENT)
Dept: REHABILITATION | Facility: HOSPITAL | Age: 41
End: 2021-11-05
Payer: OTHER MISCELLANEOUS

## 2021-11-05 DIAGNOSIS — S61.311D LACERATION OF LEFT INDEX FINGER WITHOUT FOREIGN BODY WITH DAMAGE TO NAIL, SUBSEQUENT ENCOUNTER: Primary | ICD-10-CM

## 2021-11-05 PROCEDURE — 97110 THERAPEUTIC EXERCISES: CPT

## 2021-11-05 PROCEDURE — 97022 WHIRLPOOL THERAPY: CPT

## 2021-11-09 ENCOUNTER — CLINICAL SUPPORT (OUTPATIENT)
Dept: REHABILITATION | Facility: HOSPITAL | Age: 41
End: 2021-11-09
Payer: OTHER MISCELLANEOUS

## 2021-11-09 DIAGNOSIS — S61.311D LACERATION OF LEFT INDEX FINGER WITHOUT FOREIGN BODY WITH DAMAGE TO NAIL, SUBSEQUENT ENCOUNTER: Primary | ICD-10-CM

## 2021-11-09 PROCEDURE — 97022 WHIRLPOOL THERAPY: CPT

## 2021-11-09 PROCEDURE — 97530 THERAPEUTIC ACTIVITIES: CPT

## 2021-11-09 PROCEDURE — 97110 THERAPEUTIC EXERCISES: CPT

## 2021-11-16 ENCOUNTER — CLINICAL SUPPORT (OUTPATIENT)
Dept: REHABILITATION | Facility: HOSPITAL | Age: 41
End: 2021-11-16
Payer: OTHER MISCELLANEOUS

## 2021-11-16 DIAGNOSIS — S61.311D LACERATION OF LEFT INDEX FINGER WITHOUT FOREIGN BODY WITH DAMAGE TO NAIL, SUBSEQUENT ENCOUNTER: Primary | ICD-10-CM

## 2021-11-16 PROCEDURE — 97110 THERAPEUTIC EXERCISES: CPT

## 2021-11-16 PROCEDURE — 97022 WHIRLPOOL THERAPY: CPT

## 2021-11-18 ENCOUNTER — CLINICAL SUPPORT (OUTPATIENT)
Dept: REHABILITATION | Facility: HOSPITAL | Age: 41
End: 2021-11-18
Payer: OTHER MISCELLANEOUS

## 2021-11-18 DIAGNOSIS — S61.311D LACERATION OF LEFT INDEX FINGER WITHOUT FOREIGN BODY WITH DAMAGE TO NAIL, SUBSEQUENT ENCOUNTER: Primary | ICD-10-CM

## 2021-11-18 PROCEDURE — 97110 THERAPEUTIC EXERCISES: CPT

## 2021-11-18 PROCEDURE — 97022 WHIRLPOOL THERAPY: CPT

## 2021-11-23 ENCOUNTER — CLINICAL SUPPORT (OUTPATIENT)
Dept: REHABILITATION | Facility: HOSPITAL | Age: 41
End: 2021-11-23
Payer: OTHER MISCELLANEOUS

## 2021-11-23 DIAGNOSIS — S61.311D LACERATION OF LEFT INDEX FINGER WITHOUT FOREIGN BODY WITH DAMAGE TO NAIL, SUBSEQUENT ENCOUNTER: Primary | ICD-10-CM

## 2021-11-23 PROCEDURE — 97022 WHIRLPOOL THERAPY: CPT

## 2021-11-23 PROCEDURE — 97110 THERAPEUTIC EXERCISES: CPT

## 2021-11-29 ENCOUNTER — OFFICE VISIT (OUTPATIENT)
Dept: ORTHOPEDICS | Facility: CLINIC | Age: 41
End: 2021-11-29
Payer: OTHER MISCELLANEOUS

## 2021-11-29 VITALS — WEIGHT: 270 LBS | HEIGHT: 70 IN | BODY MASS INDEX: 38.65 KG/M2

## 2021-11-29 DIAGNOSIS — S62.621D CLOSED DISPLACED FRACTURE OF MIDDLE PHALANX OF LEFT INDEX FINGER WITH ROUTINE HEALING, SUBSEQUENT ENCOUNTER: ICD-10-CM

## 2021-11-29 DIAGNOSIS — M20.019 MALLET DEFORMITY OF INDEX FINGER: ICD-10-CM

## 2021-11-29 DIAGNOSIS — S61.211A LACERATION OF LEFT INDEX FINGER WITHOUT FOREIGN BODY WITHOUT DAMAGE TO NAIL, INITIAL ENCOUNTER: Primary | ICD-10-CM

## 2021-11-29 PROCEDURE — 99999 PR PBB SHADOW E&M-EST. PATIENT-LVL II: ICD-10-PCS | Mod: PBBFAC,,, | Performed by: ORTHOPAEDIC SURGERY

## 2021-11-29 PROCEDURE — 99213 PR OFFICE/OUTPT VISIT, EST, LEVL III, 20-29 MIN: ICD-10-PCS | Mod: S$GLB,,, | Performed by: ORTHOPAEDIC SURGERY

## 2021-11-29 PROCEDURE — 99213 OFFICE O/P EST LOW 20 MIN: CPT | Mod: S$GLB,,, | Performed by: ORTHOPAEDIC SURGERY

## 2021-11-29 PROCEDURE — 99999 PR PBB SHADOW E&M-EST. PATIENT-LVL II: CPT | Mod: PBBFAC,,, | Performed by: ORTHOPAEDIC SURGERY

## 2021-12-07 ENCOUNTER — CLINICAL SUPPORT (OUTPATIENT)
Dept: REHABILITATION | Facility: HOSPITAL | Age: 41
End: 2021-12-07
Payer: OTHER MISCELLANEOUS

## 2021-12-07 DIAGNOSIS — S61.311D LACERATION OF LEFT INDEX FINGER WITHOUT FOREIGN BODY WITH DAMAGE TO NAIL, SUBSEQUENT ENCOUNTER: Primary | ICD-10-CM

## 2021-12-07 PROCEDURE — 97110 THERAPEUTIC EXERCISES: CPT

## 2021-12-07 PROCEDURE — 97022 WHIRLPOOL THERAPY: CPT

## 2021-12-09 ENCOUNTER — CLINICAL SUPPORT (OUTPATIENT)
Dept: REHABILITATION | Facility: HOSPITAL | Age: 41
End: 2021-12-09
Payer: OTHER MISCELLANEOUS

## 2021-12-09 DIAGNOSIS — S61.311D LACERATION OF LEFT INDEX FINGER WITHOUT FOREIGN BODY WITH DAMAGE TO NAIL, SUBSEQUENT ENCOUNTER: Primary | ICD-10-CM

## 2021-12-09 PROCEDURE — 97110 THERAPEUTIC EXERCISES: CPT

## 2021-12-09 PROCEDURE — 97022 WHIRLPOOL THERAPY: CPT

## 2021-12-14 ENCOUNTER — CLINICAL SUPPORT (OUTPATIENT)
Dept: REHABILITATION | Facility: HOSPITAL | Age: 41
End: 2021-12-14
Payer: OTHER MISCELLANEOUS

## 2021-12-14 DIAGNOSIS — S61.311D LACERATION OF LEFT INDEX FINGER WITHOUT FOREIGN BODY WITH DAMAGE TO NAIL, SUBSEQUENT ENCOUNTER: Primary | ICD-10-CM

## 2021-12-14 PROCEDURE — 97022 WHIRLPOOL THERAPY: CPT

## 2021-12-14 PROCEDURE — 97110 THERAPEUTIC EXERCISES: CPT

## 2021-12-22 ENCOUNTER — DOCUMENTATION ONLY (OUTPATIENT)
Dept: REHABILITATION | Facility: HOSPITAL | Age: 41
End: 2021-12-22
Payer: COMMERCIAL

## 2021-12-29 ENCOUNTER — PATIENT MESSAGE (OUTPATIENT)
Dept: ADMINISTRATIVE | Facility: OTHER | Age: 41
End: 2021-12-29
Payer: COMMERCIAL

## 2021-12-29 ENCOUNTER — LAB VISIT (OUTPATIENT)
Dept: PRIMARY CARE CLINIC | Facility: OTHER | Age: 41
End: 2021-12-29
Attending: INTERNAL MEDICINE
Payer: COMMERCIAL

## 2021-12-29 DIAGNOSIS — Z20.822 ENCOUNTER FOR LABORATORY TESTING FOR COVID-19 VIRUS: ICD-10-CM

## 2021-12-29 PROCEDURE — U0003 INFECTIOUS AGENT DETECTION BY NUCLEIC ACID (DNA OR RNA); SEVERE ACUTE RESPIRATORY SYNDROME CORONAVIRUS 2 (SARS-COV-2) (CORONAVIRUS DISEASE [COVID-19]), AMPLIFIED PROBE TECHNIQUE, MAKING USE OF HIGH THROUGHPUT TECHNOLOGIES AS DESCRIBED BY CMS-2020-01-R: HCPCS | Performed by: INTERNAL MEDICINE

## 2021-12-31 LAB
SARS-COV-2 RNA RESP QL NAA+PROBE: NOT DETECTED
SARS-COV-2- CYCLE NUMBER: NORMAL

## 2022-01-04 ENCOUNTER — CLINICAL SUPPORT (OUTPATIENT)
Dept: REHABILITATION | Facility: HOSPITAL | Age: 42
End: 2022-01-04
Payer: OTHER MISCELLANEOUS

## 2022-01-04 DIAGNOSIS — S61.211A LACERATION OF LEFT INDEX FINGER WITHOUT FOREIGN BODY WITHOUT DAMAGE TO NAIL, INITIAL ENCOUNTER: ICD-10-CM

## 2022-01-04 PROCEDURE — 97110 THERAPEUTIC EXERCISES: CPT | Mod: PN

## 2022-01-04 NOTE — PROGRESS NOTES
[facility] Outpatient Occupational Therapy Daily Treatment Note   Date: 1/04/2022  Name: Aldo Aguilar  Clinic Number: [id number]  Therapy Diagnosis:Stiffness of index finger     Onset Aug 18, 2021  Physician: Roger Henry MD  Physician Orders: Please begin therapy to the left index finger. 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   was compliant with home exercise program given  Response to previous treatment: no pain or soreness  Pain: 0/10     Location: left index finger       Objective                                   11/3/2021                     11/23/2021                 1/04/2022                                                                    INDEX                           INDEX                     INDEX                             RIGHT         LEFT                 LEFT                      LEFT  MCP Flexion        90               95/NT                 95                            95  PIP Flexion         105              75/85                  85/95                       85/95  DIP Ext-Flex         45         20-30/0-40               15-35/ 0-45            15-35/0-40     11/03/21  jt blocking extension- flexion 20- 40 active and passive 0-40                                                            15-40        1/04  PIP jt blocking flexion 40                               Hand -  Strength   Jaymar dynamometer 2nd Rung in lbs;  Pinches:  pinch gauge average of 2 trials in psi    1/04/2022 11/23 11/3/2021 11/3/2021   Position Left Left Right Left   Hand : Trial 1 Rung 100 105   87   Hand : Trial 2  110 95   79   Hand : Trial 3 110 82   70    Average 106 94 120 78   3 jaw jadyn 21 20 21 18.5   Tip 15 13 17 9    Patient received the following treatment:       Therapeutic exercises to improve functional performance while increasing strength, endurance, ROM,  and flexibility for 43  minutes, including:  -100# gripper x 20 reps (increased reps by 10) decreased to 75# x 20 reps  -joint blocking flexion and extension of DIP and flexion of PIP joint, intrinsic stretch,  -PROM flexion and extension of DIP and flexion of PIP and combined PIP and DIP flexion, then to palm place and hold 10 sec x 5 reps  -GREEN Tband lateral, 3 jaw jadyn, and tip pinch holding as therapist pulled in various planes x  20 reps each  -Lumbrical pull of GREEN theraputty then turning of screw top into putty; clockwise and counter clockwise.     Patient required no cuing for correct performance of ex.     Home Exercises and Education Provided   Education provided:  - discussed insurance limitation  - Progress towards goals   Written Home Exercises Provided: Patient instructed to cont prior HEP. 11/23 fitted with Large Digisleeve which patient is to wear qhs He was instructed on don/doff and precaution of signs of lack of circulation- if occurs he is to discontinue wearing. Exercises were reviewed and he was able to demonstrate them prior to the end of the session. he demonstrated good  understanding of the HEP provided.      See EMR under Patient  Instructions for exercises provided prior visit.      Assessment    Patient noted continues with am stiffness and during day if sedentary. His ROM is with no change. He continues with limitation in DIP flexion.  His left  strength improved by 8# which is 88% of the right. His pinches increased by up to 2#, which are equal to right except for tip pinch 2# less than the right.  He easily moved from one activity to another.  He performed all ex with no increase in symptoms.  He would benefit from continuing with therapy to further address ROM and strength deficits hindering daily activities. Patient's educational, spiritual, and cultural needs were considered.  Goals:    Long Term Goals  Patient with increase of 10 dgs flexion at DIP and no greater than 20 dg ext lag and increase of 25 dgs in PIP flexion allowing for a tighter fist in 4 weeks-ongoing  Patient with increase in spontaneous use of index for fine motor and carrying with no increase in discomfort in 4 weeks-ongoing     Plan   Continue skilled therapy 1 times weekly for 1-2  weeks to include with ther ex and activities, manual therapy, and pain modalities  as needed            BRIEN Lei

## 2022-01-11 ENCOUNTER — CLINICAL SUPPORT (OUTPATIENT)
Dept: REHABILITATION | Facility: HOSPITAL | Age: 42
End: 2022-01-11
Payer: OTHER MISCELLANEOUS

## 2022-01-11 DIAGNOSIS — S61.211A LACERATION OF LEFT INDEX FINGER WITHOUT FOREIGN BODY WITHOUT DAMAGE TO NAIL, INITIAL ENCOUNTER: Primary | ICD-10-CM

## 2022-01-11 PROCEDURE — 97110 THERAPEUTIC EXERCISES: CPT | Mod: PN

## 2022-01-11 NOTE — PLAN OF CARE
[facility] Daily/Discharge Treatment Note   Date: 1/11/2022  Name: Aldo Aguilar  Clinic Number: [id number]  Therapy Diagnosis:Stiffness of index finger     Onset Aug 18, 2021  Physician: Roger Henry MD  Physician Orders: Please begin therapy to the left index finger. 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 It's still a little stiff in the morning.   was compliant with home exercise program given  Response to previous treatment: no pain or soreness  Pain: 0/10     Location: left index finger       Objective                                  11/3/2021                     11/23/2021                 1/04/2022                                                                    INDEX                           INDEX                     INDEX                             RIGHT         LEFT                 LEFT                      LEFT  MCP Flexion        90               95/NT                 95                            95  PIP Flexion         105              75/85                  85/95                       85/95  DIP Ext-Flex         45         20-30/0-40               15-35/ 0-45            15-35/0-40     11/03/21  jt blocking extension- flexion 20- 40 active and passive 0-40                                                            15-40        1/04  PIP jt blocking flexion 40                                Hand -  Strength   Jaymar dynamometer 2nd Rung in lbs;  Pinches:  pinch gauge average of 2 trials in psi    1/04/2022 11/23 11/3/2021 11/3/2021   Position Left Left Right Left   Hand : Trial 1 Rung 100 105   87   Hand : Trial 2  110 95   79   Hand : Trial 3 110 82   70    Average 106 94 120 78   3 jaw jadyn 21 20 21 18.     Tip 15 13 17 9                                Patient received the following treatment:     Therapeutic exercises to improve functional performance while increasing strength, endurance, ROM,  and flexibility for 45 minutes, including:  -BLACK digiflex tip prehension x 20 reps  -100# gripper x 20 reps then decreased to 75# x 20 reps  -joint blocking flexion and extension of DIP and flexion of PIP joint, intrinsic stretch,  -PROM flexion and extension of DIP and flexion of PIP and combined PIP and DIP flexion, then to palm place and hold 10 sec x 5 reps  -GREEN Tband lateral, 3 jaw jadyn, and tip pinch holding as therapist pulled in various planes x  20 reps each  -Lumbrical pull of GREEN theraputty then turning of screw top into putty; clockwise and counter clockwise.  -BLACK clothespin 3 jaw jadyn, lateral and tip prehension 29 reps each     Patient required no cuing for correct performance of ex.     Home Exercises and Education Provided   Education provided:  - discussed insurance limitation  - Progress towards goals   Written Home Exercises Provided: Patient instructed to cont prior HEP. 11/23 fitted with Large Digisleeve which patient is to wear qhs He was instructed on don/doff and precaution of signs of lack of circulation- if occurs he is to discontinue wearing. Exercises were reviewed and he was able to demonstrate  them prior to the end of the session. he demonstrated good  understanding of the HEP provided.      See EMR under Patient Instructions for exercises provided prior visit.      Assessment    Patient noted continues with am stiffness but not as bad. His ROM is with no change. He continues with limitation in DIP flexion.  His left  strength improved by 8# which is 88% of the right. His pinches increased by up to 2#, which are equal to right except for tip pinch 2# less than the right.  He easily moved from one activity to another.  He performed all ex with no increase in symptoms.  . Patient's educational, spiritual, and cultural needs were considered.  Goals:    Long Term Goals  Patient with increase of 10 dgs flexion at DIP and no greater than 20 dg ext lag and increase of 25 dgs in PIP flexion allowing for a tighter fist in 4 weeks-partially met  Patient with increase in spontaneous use of index for fine motor and carrying with no increase in discomfort in 4 weeks-met     Plan   Discontinue therapy. Patient has reached max benefit from therapy.       BRIEN Lei

## 2022-01-24 ENCOUNTER — OFFICE VISIT (OUTPATIENT)
Dept: ORTHOPEDICS | Facility: CLINIC | Age: 42
End: 2022-01-24
Payer: OTHER MISCELLANEOUS

## 2022-01-24 VITALS
BODY MASS INDEX: 38.65 KG/M2 | DIASTOLIC BLOOD PRESSURE: 78 MMHG | SYSTOLIC BLOOD PRESSURE: 127 MMHG | WEIGHT: 270 LBS | HEART RATE: 74 BPM | HEIGHT: 70 IN

## 2022-01-24 DIAGNOSIS — S62.621D CLOSED DISPLACED FRACTURE OF MIDDLE PHALANX OF LEFT INDEX FINGER WITH ROUTINE HEALING, SUBSEQUENT ENCOUNTER: Primary | ICD-10-CM

## 2022-01-24 DIAGNOSIS — M20.019 MALLET DEFORMITY OF INDEX FINGER: ICD-10-CM

## 2022-01-24 DIAGNOSIS — S61.211A LACERATION OF LEFT INDEX FINGER WITHOUT FOREIGN BODY WITHOUT DAMAGE TO NAIL, INITIAL ENCOUNTER: ICD-10-CM

## 2022-01-24 PROCEDURE — 99024 POSTOP FOLLOW-UP VISIT: CPT | Mod: S$GLB,,, | Performed by: ORTHOPAEDIC SURGERY

## 2022-01-24 PROCEDURE — 99999 PR PBB SHADOW E&M-EST. PATIENT-LVL III: CPT | Mod: PBBFAC,,, | Performed by: ORTHOPAEDIC SURGERY

## 2022-01-24 PROCEDURE — 99024 PR POST-OP FOLLOW-UP VISIT: ICD-10-PCS | Mod: S$GLB,,, | Performed by: ORTHOPAEDIC SURGERY

## 2022-01-24 PROCEDURE — 99999 PR PBB SHADOW E&M-EST. PATIENT-LVL III: ICD-10-PCS | Mod: PBBFAC,,, | Performed by: ORTHOPAEDIC SURGERY

## 2022-01-24 NOTE — PROGRESS NOTES
Mr Aguilar returns to clinic today.  Has a history of left index finger mallet deformity with nail bed laceration.  He is overall doing well nail.  He has no major complaints he still has some stiffness in the mornings    Physical exam:  Examination of the left index finger reveals that all wounds are well healed.  There is no edema.  There is no significant extensor lag.  He is able to flex to the distal palmar crease but he has limited flexion at the D IP joint.  He does have sensation intact at the tip of the finger and capillary refill is less than 2 seconds    Assessment:  Status post left index finger laceration with damage to the nail bed as well as mallet deformity    Plan:    1. He is allowed to return to activity as tolerated    2. Will follow up with me on a p.r.n. basis

## 2022-03-15 ENCOUNTER — OFFICE VISIT (OUTPATIENT)
Dept: FAMILY MEDICINE | Facility: CLINIC | Age: 42
End: 2022-03-15
Payer: COMMERCIAL

## 2022-03-15 ENCOUNTER — HOSPITAL ENCOUNTER (OUTPATIENT)
Dept: RADIOLOGY | Facility: CLINIC | Age: 42
Discharge: HOME | End: 2022-03-15
Attending: FAMILY MEDICINE
Payer: COMMERCIAL

## 2022-03-15 VITALS
SYSTOLIC BLOOD PRESSURE: 106 MMHG | HEIGHT: 70 IN | HEART RATE: 99 BPM | WEIGHT: 297.19 LBS | OXYGEN SATURATION: 96 % | DIASTOLIC BLOOD PRESSURE: 68 MMHG | BODY MASS INDEX: 42.55 KG/M2

## 2022-03-15 DIAGNOSIS — M25.562 ACUTE PAIN OF LEFT KNEE: ICD-10-CM

## 2022-03-15 DIAGNOSIS — M22.8X2 PATELLAR MALTRACKING, LEFT: Primary | ICD-10-CM

## 2022-03-15 PROCEDURE — 99999 PR PBB SHADOW E&M-EST. PATIENT-LVL III: CPT | Mod: PBBFAC,,, | Performed by: FAMILY MEDICINE

## 2022-03-15 PROCEDURE — 3074F SYST BP LT 130 MM HG: CPT | Mod: CPTII,S$GLB,, | Performed by: FAMILY MEDICINE

## 2022-03-15 PROCEDURE — 73562 XR KNEE ORTHO LEFT WITH FLEXION: ICD-10-PCS | Mod: 26,RT,S$GLB, | Performed by: RADIOLOGY

## 2022-03-15 PROCEDURE — 73564 XR KNEE ORTHO LEFT WITH FLEXION: ICD-10-PCS | Mod: 26,LT,S$GLB, | Performed by: RADIOLOGY

## 2022-03-15 PROCEDURE — 3078F DIAST BP <80 MM HG: CPT | Mod: CPTII,S$GLB,, | Performed by: FAMILY MEDICINE

## 2022-03-15 PROCEDURE — 3008F BODY MASS INDEX DOCD: CPT | Mod: CPTII,S$GLB,, | Performed by: FAMILY MEDICINE

## 2022-03-15 PROCEDURE — 73564 X-RAY EXAM KNEE 4 OR MORE: CPT | Mod: 26,LT,S$GLB, | Performed by: RADIOLOGY

## 2022-03-15 PROCEDURE — 99999 PR PBB SHADOW E&M-EST. PATIENT-LVL III: ICD-10-PCS | Mod: PBBFAC,,, | Performed by: FAMILY MEDICINE

## 2022-03-15 PROCEDURE — 1159F MED LIST DOCD IN RCRD: CPT | Mod: CPTII,S$GLB,, | Performed by: FAMILY MEDICINE

## 2022-03-15 PROCEDURE — 99213 PR OFFICE/OUTPT VISIT, EST, LEVL III, 20-29 MIN: ICD-10-PCS | Mod: S$GLB,,, | Performed by: FAMILY MEDICINE

## 2022-03-15 PROCEDURE — 3008F PR BODY MASS INDEX (BMI) DOCUMENTED: ICD-10-PCS | Mod: CPTII,S$GLB,, | Performed by: FAMILY MEDICINE

## 2022-03-15 PROCEDURE — 73562 X-RAY EXAM OF KNEE 3: CPT | Mod: TC,FY,PO,RT

## 2022-03-15 PROCEDURE — 1160F PR REVIEW ALL MEDS BY PRESCRIBER/CLIN PHARMACIST DOCUMENTED: ICD-10-PCS | Mod: CPTII,S$GLB,, | Performed by: FAMILY MEDICINE

## 2022-03-15 PROCEDURE — 3074F PR MOST RECENT SYSTOLIC BLOOD PRESSURE < 130 MM HG: ICD-10-PCS | Mod: CPTII,S$GLB,, | Performed by: FAMILY MEDICINE

## 2022-03-15 PROCEDURE — 73562 X-RAY EXAM OF KNEE 3: CPT | Mod: 26,RT,S$GLB, | Performed by: RADIOLOGY

## 2022-03-15 PROCEDURE — 3078F PR MOST RECENT DIASTOLIC BLOOD PRESSURE < 80 MM HG: ICD-10-PCS | Mod: CPTII,S$GLB,, | Performed by: FAMILY MEDICINE

## 2022-03-15 PROCEDURE — 1159F PR MEDICATION LIST DOCUMENTED IN MEDICAL RECORD: ICD-10-PCS | Mod: CPTII,S$GLB,, | Performed by: FAMILY MEDICINE

## 2022-03-15 PROCEDURE — 1160F RVW MEDS BY RX/DR IN RCRD: CPT | Mod: CPTII,S$GLB,, | Performed by: FAMILY MEDICINE

## 2022-03-15 PROCEDURE — 99213 OFFICE O/P EST LOW 20 MIN: CPT | Mod: S$GLB,,, | Performed by: FAMILY MEDICINE

## 2022-03-15 RX ORDER — MELOXICAM 15 MG/1
15 TABLET ORAL DAILY PRN
Qty: 30 TABLET | Refills: 2 | Status: SHIPPED | OUTPATIENT
Start: 2022-03-15 | End: 2022-06-20

## 2022-03-15 RX ORDER — IBUPROFEN 800 MG/1
800 TABLET ORAL 3 TIMES DAILY
COMMUNITY
End: 2022-06-20

## 2022-03-15 NOTE — PROGRESS NOTES
Subjective:          Chief Complaint: Aldo Aguilar is a 42 y.o. male who had concerns including Knee Pain (left).    Here with complaints of L knee pain for the last few weeks. No known injury. States that he just woke up with it hurting one morning. Thinks there has been some swelling. Pain is not constant, mainly feels in whole knee when kneeling or squatting. Has not felt unstable or locked up. Has been wearing a brace which does help. Has also been icing it and using ibuprofen with some relief.       Review of Systems   Constitutional: Negative for chills and decreased appetite.   HENT: Negative for congestion and sore throat.    Eyes: Negative for blurred vision.   Cardiovascular: Negative for chest pain, dyspnea on exertion and palpitations.   Respiratory: Negative for cough and shortness of breath.    Skin: Negative for rash.   Neurological: Negative for difficulty with concentration, disturbances in coordination and headaches.   Psychiatric/Behavioral: Negative for altered mental status, depression, hallucinations, memory loss and suicidal ideas.                   Objective:        General: Aldo is well-developed, well-nourished, appears stated age, in no acute distress, alert and oriented to time, place and person.     General    Nursing note and vitals reviewed.  Constitutional: He is oriented to person, place, and time. He appears well-developed and well-nourished.   HENT:   Nose: Nose normal.   Eyes: EOM are normal. Pupils are equal, round, and reactive to light.   Neck: Neck supple.   Cardiovascular: Normal rate.    Pulmonary/Chest: Effort normal.   Abdominal: Soft.   Neurological: He is alert and oriented to person, place, and time. He has normal reflexes.   Psychiatric: He has a normal mood and affect. His behavior is normal. Judgment and thought content normal.           Right Knee Exam   Right knee exam is normal.    Inspection   Effusion: absent    Range of Motion   Extension: 50   Flexion:  140     Tests   Meniscus   Linsey:  Medial - negative Lateral - negative  Ligament Examination Lachman: normal (-1 to 2mm) PCL-Posterior Drawer: normal (0 to 2mm)     MCL - Valgus: normal (0 to 2mm)  LCL - Varus: normal  Patella   Patellar apprehension: negative  Passive Patellar Tilt: neutral  Patellar Tracking: normal    Other   Popliteal (Baker's) Cyst: absent  Sensation: normal    Left Knee Exam     Inspection   Effusion: present    Tenderness   The patient tender to palpation of the patella.    Crepitus   The patient has crepitus of the patella.    Range of Motion   Extension: 50   Flexion: 140     Tests   Meniscus   Linsey:  Medial - negative Lateral - negative  Stability Lachman: normal (-1 to 2mm) PCL-Posterior Drawer: normal (0 to 2mm)  MCL - Valgus: normal (0 to 2mm)  LCL - Varus: normal (0 to 2mm)  Patella   Patellar apprehension: negative  Passive Patellar Tilt: neutral and lateral tilt  Patellar Tracking: normal  Patellar Glide (Quadrants): Lateral - 2     Other   Popliteal (Baker's) Cyst: present  Sensation: normal    Muscle Strength   Right Lower Extremity   Quadriceps:  5/5   Hamstrin/5   Left Lower Extremity   Quadriceps:  5/5   Hamstrin/5     Vascular Exam     Right Pulses  Dorsalis Pedis:      2+          Left Pulses  Dorsalis Pedis:      2+                      Assessment:       Encounter Diagnoses   Name Primary?    Acute pain of left knee     Patellar maltracking, left Yes          Plan:       No tenderness on exam. Possible patellar disorder maybe chondromalacia.     Rx mobic for pain and inflammation. Continue use of brace as needed. X-rays today and will call if abnormal. Injection therapy a possibility if does not resolve.     Aldo was seen today for knee pain.    Diagnoses and all orders for this visit:    Patellar maltracking, left    Acute pain of left knee  -     X-ray Knee Ortho Left with Flexion; Future    Other orders  -     meloxicam (MOBIC) 15 MG tablet; Take 1  tablet (15 mg total) by mouth daily as needed for Pain.                      Patient questionnaires may have been collected.

## 2022-07-31 DIAGNOSIS — M25.562 ACUTE PAIN OF LEFT KNEE: Primary | ICD-10-CM

## 2022-07-31 NOTE — TELEPHONE ENCOUNTER
Refill Routing Note   Medication(s) are not appropriate for processing by [facility] for the following reason(s):      - Outside of protocol  - Unclear if patient follows with you     [facility] action(s):  Route          Medication reconciliation completed: No     Appointments  past 12m or future 3m with PCP    Date Provider   Last Visit   Visit date not found Damian Bermudez MD   Next Visit   Visit date not found Damian Bermudez MD   ED visits in past 90 days: 0        Note composed:6:23 PM 07/31/2022

## 2022-08-02 RX ORDER — MELOXICAM 15 MG/1
TABLET ORAL
Qty: 30 TABLET | Refills: 0 | Status: SHIPPED | OUTPATIENT
Start: 2022-08-02 | End: 2022-09-07

## 2023-12-07 ENCOUNTER — LAB VISIT (OUTPATIENT)
Dept: LAB | Facility: HOSPITAL | Age: 43
End: 2023-12-07
Attending: INTERNAL MEDICINE
Payer: COMMERCIAL

## 2023-12-07 ENCOUNTER — PATIENT MESSAGE (OUTPATIENT)
Dept: FAMILY MEDICINE | Facility: CLINIC | Age: 43
End: 2023-12-07

## 2023-12-07 ENCOUNTER — OFFICE VISIT (OUTPATIENT)
Dept: FAMILY MEDICINE | Facility: CLINIC | Age: 43
End: 2023-12-07
Payer: COMMERCIAL

## 2023-12-07 VITALS
HEART RATE: 103 BPM | HEIGHT: 70 IN | DIASTOLIC BLOOD PRESSURE: 89 MMHG | WEIGHT: 310 LBS | OXYGEN SATURATION: 96 % | BODY MASS INDEX: 44.38 KG/M2 | SYSTOLIC BLOOD PRESSURE: 128 MMHG

## 2023-12-07 DIAGNOSIS — Z00.00 ANNUAL PHYSICAL EXAM: Primary | ICD-10-CM

## 2023-12-07 DIAGNOSIS — Z00.00 ANNUAL PHYSICAL EXAM: ICD-10-CM

## 2023-12-07 LAB
ANION GAP SERPL CALC-SCNC: 7 MMOL/L (ref 8–16)
BILIRUB UR QL STRIP: NEGATIVE
BUN SERPL-MCNC: 10 MG/DL (ref 6–20)
CALCIUM SERPL-MCNC: 9.6 MG/DL (ref 8.7–10.5)
CHLORIDE SERPL-SCNC: 101 MMOL/L (ref 95–110)
CHOLEST SERPL-MCNC: 192 MG/DL (ref 120–199)
CHOLEST/HDLC SERPL: 2.3 {RATIO} (ref 2–5)
CLARITY UR: CLEAR
CO2 SERPL-SCNC: 30 MMOL/L (ref 23–29)
COLOR UR: COLORLESS
CREAT SERPL-MCNC: 1 MG/DL (ref 0.5–1.4)
EST. GFR  (NO RACE VARIABLE): >60 ML/MIN/1.73 M^2
ESTIMATED AVG GLUCOSE: 114 MG/DL (ref 68–131)
GLUCOSE SERPL-MCNC: 85 MG/DL (ref 70–110)
GLUCOSE UR QL STRIP: NEGATIVE
HBA1C MFR BLD: 5.6 % (ref 4.5–6.2)
HDLC SERPL-MCNC: 82 MG/DL (ref 40–75)
HDLC SERPL: 42.7 % (ref 20–50)
HGB UR QL STRIP: NEGATIVE
KETONES UR QL STRIP: NEGATIVE
LDLC SERPL CALC-MCNC: 95.8 MG/DL (ref 63–159)
LEUKOCYTE ESTERASE UR QL STRIP: NEGATIVE
NITRITE UR QL STRIP: NEGATIVE
NONHDLC SERPL-MCNC: 110 MG/DL
PH UR STRIP: 7 [PH] (ref 5–8)
POTASSIUM SERPL-SCNC: 4.6 MMOL/L (ref 3.5–5.1)
PROT UR QL STRIP: NEGATIVE
SODIUM SERPL-SCNC: 138 MMOL/L (ref 136–145)
SP GR UR STRIP: 1.01 (ref 1–1.03)
TRIGL SERPL-MCNC: 71 MG/DL (ref 30–150)
TSH SERPL DL<=0.005 MIU/L-ACNC: 1.44 UIU/ML (ref 0.34–5.6)
URN SPEC COLLECT METH UR: ABNORMAL
UROBILINOGEN UR STRIP-ACNC: NEGATIVE EU/DL

## 2023-12-07 PROCEDURE — 3074F PR MOST RECENT SYSTOLIC BLOOD PRESSURE < 130 MM HG: ICD-10-PCS | Mod: CPTII,S$GLB,, | Performed by: INTERNAL MEDICINE

## 2023-12-07 PROCEDURE — 3008F PR BODY MASS INDEX (BMI) DOCUMENTED: ICD-10-PCS | Mod: CPTII,S$GLB,, | Performed by: INTERNAL MEDICINE

## 2023-12-07 PROCEDURE — 1159F MED LIST DOCD IN RCRD: CPT | Mod: CPTII,S$GLB,, | Performed by: INTERNAL MEDICINE

## 2023-12-07 PROCEDURE — 1159F PR MEDICATION LIST DOCUMENTED IN MEDICAL RECORD: ICD-10-PCS | Mod: CPTII,S$GLB,, | Performed by: INTERNAL MEDICINE

## 2023-12-07 PROCEDURE — 1160F PR REVIEW ALL MEDS BY PRESCRIBER/CLIN PHARMACIST DOCUMENTED: ICD-10-PCS | Mod: CPTII,S$GLB,, | Performed by: INTERNAL MEDICINE

## 2023-12-07 PROCEDURE — 80048 BASIC METABOLIC PNL TOTAL CA: CPT | Performed by: INTERNAL MEDICINE

## 2023-12-07 PROCEDURE — 99396 PR PREVENTIVE VISIT,EST,40-64: ICD-10-PCS | Mod: S$GLB,,, | Performed by: INTERNAL MEDICINE

## 2023-12-07 PROCEDURE — 3079F DIAST BP 80-89 MM HG: CPT | Mod: CPTII,S$GLB,, | Performed by: INTERNAL MEDICINE

## 2023-12-07 PROCEDURE — 84443 ASSAY THYROID STIM HORMONE: CPT | Performed by: INTERNAL MEDICINE

## 2023-12-07 PROCEDURE — 99396 PREV VISIT EST AGE 40-64: CPT | Mod: S$GLB,,, | Performed by: INTERNAL MEDICINE

## 2023-12-07 PROCEDURE — 1160F RVW MEDS BY RX/DR IN RCRD: CPT | Mod: CPTII,S$GLB,, | Performed by: INTERNAL MEDICINE

## 2023-12-07 PROCEDURE — 36415 COLL VENOUS BLD VENIPUNCTURE: CPT | Performed by: INTERNAL MEDICINE

## 2023-12-07 PROCEDURE — 3008F BODY MASS INDEX DOCD: CPT | Mod: CPTII,S$GLB,, | Performed by: INTERNAL MEDICINE

## 2023-12-07 PROCEDURE — 3074F SYST BP LT 130 MM HG: CPT | Mod: CPTII,S$GLB,, | Performed by: INTERNAL MEDICINE

## 2023-12-07 PROCEDURE — 83036 HEMOGLOBIN GLYCOSYLATED A1C: CPT | Performed by: INTERNAL MEDICINE

## 2023-12-07 PROCEDURE — 3044F PR MOST RECENT HEMOGLOBIN A1C LEVEL <7.0%: ICD-10-PCS | Mod: CPTII,S$GLB,, | Performed by: INTERNAL MEDICINE

## 2023-12-07 PROCEDURE — 3044F HG A1C LEVEL LT 7.0%: CPT | Mod: CPTII,S$GLB,, | Performed by: INTERNAL MEDICINE

## 2023-12-07 PROCEDURE — 80061 LIPID PANEL: CPT | Performed by: INTERNAL MEDICINE

## 2023-12-07 PROCEDURE — 3079F PR MOST RECENT DIASTOLIC BLOOD PRESSURE 80-89 MM HG: ICD-10-PCS | Mod: CPTII,S$GLB,, | Performed by: INTERNAL MEDICINE

## 2023-12-07 PROCEDURE — 81003 URINALYSIS AUTO W/O SCOPE: CPT | Performed by: INTERNAL MEDICINE

## 2023-12-07 NOTE — PROGRESS NOTES
Subjective:       Patient ID: Aldo Aguilar is a 43 y.o. male.    Chief Complaint: Establish Care (Declined flu vaccine ) and Annual Exam    This is a new patient appointment for Mr. Aldo Trotter who is a 43-year-old gentleman.      Thus far he does not have any major medical issues like establish blood pressure, significant cholesterol, thyroid or diabetes.      Thus far his medical and surgical issues have been the following:-    1.-obesity with a BMI greater than 40   2.-status post cholecystectomy  3.-right rotator cuff tear and right biceps repair by Dr. Manzo  4.-knee problems and pains.    He had the last physical done in  by Dr. Toan barakat.  And at that time his cholesterol numbers, blood sugar numbers and thyroid were fine.    He does admit to some snoring at night and he might have some apneic episodes but he would like to try to lose some weight.      Preventive care measures:-    He had his Tdap vaccination in .      He did get couple of COVID vaccines in past.      He does get interim intermittently dental and eye checkup.  I have advised him to be more regular than that.    He did inquire about colonoscopy and prostate cancer checkup.  I have advised him the colon cancer screening will start at age 45 and prostate can be considered to be started at 50 unless until there is a family history.      Family history has been reviewed:-    Father  at early age of perhaps 60s or so and he had problems with drugs and alcohol.  Mother is doing okay with her problems.        History reviewed. No pertinent past medical history.  Social History     Socioeconomic History    Marital status:     Number of children: 2   Tobacco Use    Smoking status: Never    Smokeless tobacco: Never   Substance and Sexual Activity    Alcohol use: Yes     Comment: occasionally    Drug use: No    Sexual activity: Yes     Social Determinants of Health     Financial Resource Strain: Low Risk  (2023)    Overall  Financial Resource Strain (CARDIA)     Difficulty of Paying Living Expenses: Not hard at all   Food Insecurity: No Food Insecurity (12/6/2023)    Hunger Vital Sign     Worried About Running Out of Food in the Last Year: Never true     Ran Out of Food in the Last Year: Never true   Transportation Needs: No Transportation Needs (12/6/2023)    PRAPARE - Transportation     Lack of Transportation (Medical): No     Lack of Transportation (Non-Medical): No   Physical Activity: Unknown (12/6/2023)    Exercise Vital Sign     Days of Exercise per Week: Patient refused     Minutes of Exercise per Session: Patient refused   Stress: No Stress Concern Present (12/6/2023)    [place] [employer] - Occupational Stress Questionnaire     Feeling of Stress : Not at all   Social Connections: Unknown (12/6/2023)    Social Connection and Isolation Panel [NHANES]     Frequency of Communication with Friends and Family: Twice a week     Frequency of Social Gatherings with Friends and Family: Once a week     Active Member of Clubs or Organizations: Yes     Attends Club or Organization Meetings: 1 to 4 times per year     Marital Status:    Housing Stability: Low Risk  (12/6/2023)    Housing Stability Vital Sign     Unable to Pay for Housing in the Last Year: No     Number of Places Lived in the Last Year: 1     Unstable Housing in the Last Year: No     Past Surgical History:   Procedure Laterality Date    CHOLECYSTECTOMY      ROTATOR CUFF REPAIR Right      Family History   Problem Relation Age of Onset    Heart disease Mother         Heart Procedure-unknown    No Known Problems Sister     No Known Problems Brother     No Known Problems Daughter     Asthma Son        Review of Systems   Constitutional:  Negative for activity change, chills, fatigue, fever and unexpected weight change.   HENT:  Negative for hearing loss, rhinorrhea and trouble swallowing.    Eyes:  Negative for pain, discharge and visual disturbance.  "  Respiratory:  Negative for chest tightness, shortness of breath and wheezing.         No formal diagnosis of sleep apnea though he snores at night.   Cardiovascular:  Negative for chest pain and palpitations.   Gastrointestinal:  Negative for blood in stool, constipation, diarrhea and vomiting.        He had his gallbladder removed following attack of gallbladder pain.   Endocrine: Negative for polydipsia and polyuria.   Genitourinary:  Negative for difficulty urinating, hematuria and urgency.   Musculoskeletal:  Positive for arthralgias and joint swelling. Negative for neck pain.        He had right rotator cuff and biceps surgery by Dr. Manzo.   Neurological:  Negative for weakness and headaches.   Psychiatric/Behavioral:  Negative for confusion and dysphoric mood.          Objective:      Blood pressure 128/89, pulse 103, height 5' 10" (1.778 m), weight (!) 140.6 kg (310 lb), SpO2 96 %. Body mass index is 44.48 kg/m².  Physical Exam  Vitals and nursing note reviewed.   Constitutional:       General: He is not in acute distress.     Appearance: He is well-developed. He is obese. He is not ill-appearing, toxic-appearing or diaphoretic.      Comments: BMI is 44.48   HENT:      Head: Normocephalic and atraumatic.      Nose: Nose normal.      Mouth/Throat:      Pharynx: No oropharyngeal exudate or posterior oropharyngeal erythema.   Eyes:      Conjunctiva/sclera: Conjunctivae normal.   Neck:      Thyroid: No thyromegaly.      Vascular: No JVD.      Trachea: No tracheal deviation.   Cardiovascular:      Rate and Rhythm: Normal rate and regular rhythm.      Heart sounds: Normal heart sounds. No murmur heard.     No friction rub. No gallop.   Pulmonary:      Effort: Pulmonary effort is normal. No respiratory distress.      Breath sounds: Normal breath sounds. No wheezing or rales.   Abdominal:      General: There is no distension.      Palpations: Abdomen is soft.      Tenderness: There is no abdominal tenderness.      " Comments: Large abdominal girth.  Barely visible scar arvin of laparoscopic cholecystectomy are noted.   Musculoskeletal:         General: No swelling. Normal range of motion.      Cervical back: Neck supple. No rigidity.      Right lower leg: No edema.      Left lower leg: No edema.   Lymphadenopathy:      Cervical: No cervical adenopathy.   Skin:     General: Skin is warm and dry.   Neurological:      Mental Status: He is alert. Mental status is at baseline.      Gait: Gait normal.   Psychiatric:         Mood and Affect: Mood normal.         Behavior: Behavior normal.           Assessment:               Annual physical exam  -     Lipid Panel; Future; Expected date: 12/07/2023  -     Hemoglobin A1C; Future; Expected date: 12/07/2023  -     Basic Metabolic Panel; Future; Expected date: 12/07/2023  -     TSH; Future; Expected date: 12/07/2023  -     Urinalysis; Future; Expected date: 12/07/2023       Component      Latest Ref Rng 12/7/2023   Sodium      136 - 145 mmol/L 138    Potassium      3.5 - 5.1 mmol/L 4.6    Chloride      95 - 110 mmol/L 101    CO2      23 - 29 mmol/L 30 (H)    Glucose      70 - 110 mg/dL 85    BUN      6 - 20 mg/dL 10    Creatinine      0.5 - 1.4 mg/dL 1.0    Calcium      8.7 - 10.5 mg/dL 9.6    Anion Gap      8 - 16 mmol/L 7 (L)    eGFR      >60 mL/min/1.73 m^2 >60.0    Cholesterol Total      120 - 199 mg/dL 192    Triglycerides      30 - 150 mg/dL 71    HDL      40 - 75 mg/dL 82 (H)    LDL Cholesterol      63.0 - 159.0 mg/dL 95.8    HDL/Cholesterol Ratio      20.0 - 50.0 % 42.7    Total Cholesterol/HDL Ratio      2.0 - 5.0  2.3    Non-HDL Cholesterol      mg/dL 110    Hemoglobin A1C External      4.5 - 6.2 % 5.6    Estimated Avg Glucose      68 - 131 mg/dL 114       Legend:  (H) High  (L) Low  Lab Visit on 12/07/2023   Component Date Value Ref Range Status    Hemoglobin A1C 12/07/2023 5.6  4.5 - 6.2 % Final    Estimated Avg Glucose 12/07/2023 114  68 - 131 mg/dL Final   Annual physical has  been done.      Blood pressures have been found to be elevated.  Subsequent blood pressures were borderline normal.    I will throw him a challenge to lose about 6-10 lb of weight in the next 3-6 months.  Watch his diet and cut down on the salt.      He likely has sleep apnea also.  But before we start testing or prescribing him machine for sleep apnea, I would like him to lose 6-10 lb of weight.    He is already updated on Tdap vaccination in 2020.      He is very interested in getting a:  Checkup and prostate checkup.  I have advised him that colonoscopy and colon cancer screening will start at age 45 and prostate will start at age 50.  He is okay with that.  No strong family history of colon or prostate cancers.      I have advised him to consider influenza vaccination.  At this point he defers.      Continue with influenza and COVID precautions also.  He did have 2 COVID vaccines in past      Plan:           Annual physical exam  -     Lipid Panel; Future; Expected date: 12/07/2023  -     Hemoglobin A1C; Future; Expected date: 12/07/2023  -     Basic Metabolic Panel; Future; Expected date: 12/07/2023  -     TSH; Future; Expected date: 12/07/2023  -     Urinalysis; Future; Expected date: 12/07/2023      Labs have been ordered.  These have to be done fasting.    I will throw him a challenge to lose about 6-10 lb in the next 6 months.    When he came in his blood pressure is slightly elevated and subsequently seem to settle down.    He does have some snoring and I do suspect that he might have sleep apnea.    I would recommend him and he agrees that he will lose about 6-10 lb of weight and will review his situation.    I would like to see him back in 6 months to 1 year time.      He will be notified about his labs.      He should get a regular dental and eye checkup also.    Continue with the influenza, COVID and other infection precautions.  Consider taking influenza vaccine also.    He does play golf and he has  knee pains.  He did see Dr. Iftikhar Manzo who had drained some fluid from the knee.  I do suspect that he might have some arthritis or internal knee derangement.    Weight loss will be definitely helpful.    Some of the patient's take  over-the-counter Osteo Bi-Flex supplement which is natural plant based cartilage and it might help him with knee pains.    Incorporate more greens and vegetables and less of fried and fatty food.  He does leave to work at approximately 5:45 a.m. in the morning and sometimes he skips his breakfast.  Lunchtime is usually around his workplace with different options available from local restaurant or cafeterias or fast food places.  Gradually he has to make healthy choices down the road.  His weight today is 310 lb for a height of 5 ft 8 in 5 ft 10 in.  Ideally he should not be more than 180 lb.  In the next few years I will throw him a challenge to be 275 lb or less.    Follow-up in 6 months.    At the time of signing of charts, I have reviewed his labs.  A1c is 5.6.  Basic chemistry is normal.  Cholesterol numbers are good with predominant contribution from HDL cholesterol.  Though slightly more elevated as compared to past.  Thyroid test is pending.  I sent a message to the patient.      Current Outpatient Medications:     meloxicam (MOBIC) 15 MG tablet, TAKE 1 TABLET BY MOUTH EVERY DAY AS NEEDED FOR PAIN, Disp: 30 tablet, Rfl: 0

## 2023-12-07 NOTE — PROGRESS NOTES
Please review your labs done today.  Your cholesterol is 192 but it is mostly contributed by the HDL or good cholesterol which is 82.  Slightly more than last time.  Your hemoglobin A1c which is a test for diabetes is 5.6 which is in good range.  Your basic chemistry tests including electrolytes, blood sugars are within normal range.  Please ignore the slightly elevated CO2 which is carbon dioxide and slightly low anion gap.  These are normally seen in our hospital labs.  Otherwise no other major issues.  I will see you back in 6 months time with at least 6 lb of weight less.      Dr. Julisa MANRIQUEZ

## 2024-03-17 NOTE — PROGRESS NOTES
Subjective:       Patient ID: Aldo Aguilar is a 44 y.o. male.    Chief Complaint: Numbness (Left arm )    Patient is a 44-year-old male who comes for evaluation.  Complains of numbness on the left side.      This started about 2 or 3 weeks ago.  He does not recall any trauma or injury.  He does not recall if there was any pressure like his wife sleeping on his arm.  It does not lift any weights.      He works as an  and probably has to handle some electrical wires and change of appliances.  Nothing heavy lifting.  He works for the [employer] in [place].    He does not recall any neurological condition like multiple sclerosis and family members.    Longstanding medical issues include the following:-    Severe obesity with a BMI greater than 40.  2.-gastroesophageal reflux  3.-history of cholecystitis  4.-history of extensor tendon laceration on the left side with a open wound        History reviewed. No pertinent past medical history.  Social History     Socioeconomic History    Marital status:     Number of children: 2   Tobacco Use    Smoking status: Never    Smokeless tobacco: Never   Substance and Sexual Activity    Alcohol use: Yes     Comment: occasionally    Drug use: No    Sexual activity: Yes     Social Determinants of Health     Financial Resource Strain: Low Risk  (12/6/2023)    Overall Financial Resource Strain (CARDIA)     Difficulty of Paying Living Expenses: Not hard at all   Food Insecurity: No Food Insecurity (12/6/2023)    Hunger Vital Sign     Worried About Running Out of Food in the Last Year: Never true     Ran Out of Food in the Last Year: Never true   Transportation Needs: No Transportation Needs (12/6/2023)    PRAPARE - Transportation     Lack of Transportation (Medical): No     Lack of Transportation (Non-Medical): No   Physical Activity: Patient Declined (12/6/2023)    Exercise Vital Sign     Days of Exercise per Week: Patient declined     Minutes of  Exercise per Session: Patient declined   Stress: No Stress Concern Present (12/6/2023)    [employer] [place] [employer] - Occupational Stress Questionnaire     Feeling of Stress : Not at all   Social Connections: Unknown (12/6/2023)    Social Connection and Isolation Panel [NHANES]     Frequency of Communication with Friends and Family: Twice a week     Frequency of Social Gatherings with Friends and Family: Once a week     Active Member of Clubs or Organizations: Yes     Attends Club or Organization Meetings: 1 to 4 times per year     Marital Status:    Housing Stability: Low Risk  (12/6/2023)    Housing Stability Vital Sign     Unable to Pay for Housing in the Last Year: No     Number of Places Lived in the Last Year: 1     Unstable Housing in the Last Year: No     Past Surgical History:   Procedure Laterality Date    CHOLECYSTECTOMY      ROTATOR CUFF REPAIR Right      Family History   Problem Relation Age of Onset    Heart disease Mother         Heart Procedure-unknown    No Known Problems Sister     No Known Problems Brother     No Known Problems Daughter     Asthma Son        Review of Systems   Constitutional:  Negative for activity change, chills, fatigue, fever and unexpected weight change.   Respiratory:  Negative for chest tightness, shortness of breath and wheezing. Apnea: still we are not sure if he has sleep apnea..        No formal diagnosis of sleep apnea though he snores at night.   Cardiovascular:  Negative for chest pain and palpitations.   Gastrointestinal:  Negative for abdominal distention, abdominal pain, blood in stool, constipation, diarrhea and vomiting.        He had his gallbladder removed following attack of gallbladder pain.   Endocrine: Negative for polydipsia and polyuria.   Musculoskeletal:  Positive for arthralgias and joint swelling. Negative for neck pain.        He had right rotator cuff and biceps surgery by Dr. Manzo.   Neurological:  Positive for numbness.  Negative for weakness and headaches.   Psychiatric/Behavioral:  Negative for confusion and dysphoric mood.          Objective:      Blood pressure 120/81, pulse 79, weight (!) 138.8 kg (306 lb). Body mass index is 43.91 kg/m².  Physical Exam  Vitals and nursing note reviewed.   Constitutional:       General: He is not in acute distress.     Appearance: He is well-developed. He is obese. He is not ill-appearing, toxic-appearing or diaphoretic.      Comments: BMI is 43.91   HENT:      Head: Normocephalic and atraumatic.      Nose: Nose normal.      Mouth/Throat:      Pharynx: No oropharyngeal exudate or posterior oropharyngeal erythema.   Eyes:      Conjunctiva/sclera: Conjunctivae normal.   Neck:      Thyroid: No thyromegaly.      Vascular: No JVD.      Trachea: No tracheal deviation.   Cardiovascular:      Rate and Rhythm: Normal rate and regular rhythm.      Heart sounds: Normal heart sounds. No murmur heard.     No friction rub. No gallop.   Pulmonary:      Effort: Pulmonary effort is normal. No respiratory distress.      Breath sounds: Normal breath sounds. No wheezing or rales.   Abdominal:      General: There is no distension.      Palpations: Abdomen is soft.      Tenderness: There is no abdominal tenderness.      Comments: Large abdominal girth.  Barely visible scar arvin of laparoscopic cholecystectomy are noted.   Musculoskeletal:         General: No swelling.        Arms:       Cervical back: Neck supple. No rigidity.      Right lower leg: No edema.      Left lower leg: No edema.      Comments: The area of numbness is in the left forearm on the ventral part.   Lymphadenopathy:      Cervical: No cervical adenopathy.   Skin:     General: Skin is warm and dry.   Neurological:      Mental Status: He is alert. Mental status is at baseline.      Gait: Gait normal.      Deep Tendon Reflexes: Reflexes normal.      Comments: Reflexes are somewhat hyperreflexic.  His motor strength is good.  Sensations are grossly  intact.  [place] sign was performed with pressure over her vertex and that did not show any numbness or tingling reproduction.      On raising his arms he did not have any tingling or numbness in his hands.   Psychiatric:         Mood and Affect: Mood normal.         Behavior: Behavior normal.           Assessment:           1. Numbness and tingling in left arm  Comments:  At this point the neurological symptoms and findings are very sparse and nonspecific.  Wait for 2 weeks.  Take B complex and no pressure on arm             Plan:   Numbness and tingling in left arm  Comments:  At this point the neurological symptoms and findings are very sparse and nonspecific.  Wait for 2 weeks.  Take B complex and no pressure on arm      At this point no specific neurological deficits or findings history wise.  These symptoms have been there for about 2 weeks.  We can do the following:-    1.-refer to neurology at this point [place] on a gamut of tests including probably nerve conduction studies and EMG or even MRI of the brain to be sure that we are not dealing with conditions like MS.      2.-wait and watch for 2 weeks and send me a message back again.  Do not apply any pressure on the arms.  No heavy lifting or applying pressure on the fingers.      Take some B complex vitamin and I have shown him in nature made vitamin which will not be more than perhaps 10 dollars for bottle.  He was send me a message back otherwise in 2 weeks time.  Earlier if needed.  Will consider neurology consultation at that point.  No follow-ups on file.    No current outpatient medications on file.    Simone Egan

## 2024-03-19 ENCOUNTER — PATIENT MESSAGE (OUTPATIENT)
Dept: FAMILY MEDICINE | Facility: CLINIC | Age: 44
End: 2024-03-19

## 2024-03-19 ENCOUNTER — OFFICE VISIT (OUTPATIENT)
Dept: FAMILY MEDICINE | Facility: CLINIC | Age: 44
End: 2024-03-19
Payer: COMMERCIAL

## 2024-03-19 VITALS
BODY MASS INDEX: 43.91 KG/M2 | DIASTOLIC BLOOD PRESSURE: 81 MMHG | WEIGHT: 306 LBS | SYSTOLIC BLOOD PRESSURE: 120 MMHG | HEART RATE: 79 BPM

## 2024-03-19 DIAGNOSIS — R20.2 NUMBNESS AND TINGLING IN LEFT ARM: Primary | ICD-10-CM

## 2024-03-19 DIAGNOSIS — R20.0 NUMBNESS AND TINGLING IN LEFT ARM: Primary | ICD-10-CM

## 2024-03-19 PROCEDURE — 99999 PR PBB SHADOW E&M-EST. PATIENT-LVL III: CPT | Mod: PBBFAC,,, | Performed by: INTERNAL MEDICINE

## 2024-03-19 PROCEDURE — 1160F RVW MEDS BY RX/DR IN RCRD: CPT | Mod: CPTII,S$GLB,, | Performed by: INTERNAL MEDICINE

## 2024-03-19 PROCEDURE — 1159F MED LIST DOCD IN RCRD: CPT | Mod: CPTII,S$GLB,, | Performed by: INTERNAL MEDICINE

## 2024-03-19 PROCEDURE — 3008F BODY MASS INDEX DOCD: CPT | Mod: CPTII,S$GLB,, | Performed by: INTERNAL MEDICINE

## 2024-03-19 PROCEDURE — 3079F DIAST BP 80-89 MM HG: CPT | Mod: CPTII,S$GLB,, | Performed by: INTERNAL MEDICINE

## 2024-03-19 PROCEDURE — 3074F SYST BP LT 130 MM HG: CPT | Mod: CPTII,S$GLB,, | Performed by: INTERNAL MEDICINE

## 2024-03-19 PROCEDURE — 99213 OFFICE O/P EST LOW 20 MIN: CPT | Mod: S$GLB,,, | Performed by: INTERNAL MEDICINE

## 2024-06-10 ENCOUNTER — OFFICE VISIT (OUTPATIENT)
Dept: FAMILY MEDICINE | Facility: CLINIC | Age: 44
End: 2024-06-10
Payer: COMMERCIAL

## 2024-06-10 ENCOUNTER — PATIENT MESSAGE (OUTPATIENT)
Dept: FAMILY MEDICINE | Facility: CLINIC | Age: 44
End: 2024-06-10

## 2024-06-10 VITALS
DIASTOLIC BLOOD PRESSURE: 87 MMHG | BODY MASS INDEX: 42.23 KG/M2 | HEIGHT: 70 IN | WEIGHT: 295 LBS | SYSTOLIC BLOOD PRESSURE: 125 MMHG | HEART RATE: 68 BPM

## 2024-06-10 DIAGNOSIS — R20.0 NUMBNESS AND TINGLING IN LEFT ARM: ICD-10-CM

## 2024-06-10 DIAGNOSIS — R06.83 SNORING: Primary | ICD-10-CM

## 2024-06-10 DIAGNOSIS — E66.01 CLASS 3 SEVERE OBESITY DUE TO EXCESS CALORIES WITHOUT SERIOUS COMORBIDITY WITH BODY MASS INDEX (BMI) OF 40.0 TO 44.9 IN ADULT: ICD-10-CM

## 2024-06-10 DIAGNOSIS — R20.2 NUMBNESS AND TINGLING IN LEFT ARM: ICD-10-CM

## 2024-06-10 DIAGNOSIS — R06.81 APNEA FOR GREATER THAN 15 SECONDS: ICD-10-CM

## 2024-06-10 PROCEDURE — 99999 PR PBB SHADOW E&M-EST. PATIENT-LVL III: CPT | Mod: PBBFAC,,, | Performed by: INTERNAL MEDICINE

## 2024-06-10 PROCEDURE — 1159F MED LIST DOCD IN RCRD: CPT | Mod: CPTII,S$GLB,, | Performed by: INTERNAL MEDICINE

## 2024-06-10 PROCEDURE — 3008F BODY MASS INDEX DOCD: CPT | Mod: CPTII,S$GLB,, | Performed by: INTERNAL MEDICINE

## 2024-06-10 PROCEDURE — 1160F RVW MEDS BY RX/DR IN RCRD: CPT | Mod: CPTII,S$GLB,, | Performed by: INTERNAL MEDICINE

## 2024-06-10 PROCEDURE — 3079F DIAST BP 80-89 MM HG: CPT | Mod: CPTII,S$GLB,, | Performed by: INTERNAL MEDICINE

## 2024-06-10 PROCEDURE — 99213 OFFICE O/P EST LOW 20 MIN: CPT | Mod: S$GLB,,, | Performed by: INTERNAL MEDICINE

## 2024-06-10 PROCEDURE — 3074F SYST BP LT 130 MM HG: CPT | Mod: CPTII,S$GLB,, | Performed by: INTERNAL MEDICINE

## 2024-06-10 NOTE — LETTER
Disha 10, 2024      [facility] [address]  [address]  [address]  [place] [address]  Phone: [phone]  Fax: [phone]       Patient: Aldo Aguilar   YOB: 1980  Date of Visit: 06/10/2024    To Whom It May Concern:    Deepika Aguilar  was at [facility] on 06/10/2024. The patient may return to work/school on 6/10/2024 with no restrictions. If you have any questions or concerns, or if I can be of further assistance, please do not hesitate to contact me.    Sincerely,Electronically signed by MD Rasheeda Hsu MA

## 2024-06-10 NOTE — PROGRESS NOTES
Subjective:       Patient ID: Aldo Aguilar is a 44 y.o. male.    Chief Complaint: Numbness    Mr Aldo Aguilar is a 44 Y O Gentleman who comes for follow up.  Today he is accompanied with his wife who also takes care of his medical issues and participates in his well-being.    Three-month ago he was seen for an numbness in the left elbow region of a few days' duration.  This was treated conservatively with vitamin B12 supplementation and stretch exercises.    He is doing okay.  I do suspect that probably had some pressure injury on the nerve which led to transient [place] and this is recovered.  Cause of that injuries uncertain.    Otherwise he is doing okay.  His blood pressures are good and lipid panel is good.  Thyroid test is good.    He is morbidly obese with a BMI greater than 40.    On asking, his wife does admit that he snores loudly at time and she was check all her watch that he is apneic for more than 20 seconds.  Patient himself is very unaware of what is going on at nighttime.  His job does not involve high level of alertness.  He does not feel particularly tired.    He was watching his diet and has lost some weight.  He is started from 306 lb and he is currently 296 lb.  He goes to the gym and he was cut down on portion size.        History reviewed. No pertinent past medical history.  Social History     Socioeconomic History    Marital status:     Number of children: 2   Tobacco Use    Smoking status: Never    Smokeless tobacco: Never   Substance and Sexual Activity    Alcohol use: Yes     Comment: occasionally    Drug use: No    Sexual activity: Yes     Social Determinants of Health     Financial Resource Strain: Low Risk  (12/6/2023)    Overall Financial Resource Strain (CARDIA)     Difficulty of Paying Living Expenses: Not hard at all   Food Insecurity: No Food Insecurity (12/6/2023)    Hunger Vital Sign     Worried About Running Out of Food in the Last Year: Never true     Ran Out of Food  in the Last Year: Never true   Transportation Needs: No Transportation Needs (12/6/2023)    PRAPARE - Transportation     Lack of Transportation (Medical): No     Lack of Transportation (Non-Medical): No   Physical Activity: Insufficiently Active (6/10/2024)    Exercise Vital Sign     Days of Exercise per Week: 3 days     Minutes of Exercise per Session: 30 min   Stress: No Stress Concern Present (12/6/2023)    [employer] [place] - Occupational Stress Questionnaire     Feeling of Stress : Not at all   Housing Stability: Low Risk  (12/6/2023)    Housing Stability Vital Sign     Unable to Pay for Housing in the Last Year: No     Number of Places Lived in the Last Year: 1     Unstable Housing in the Last Year: No     Past Surgical History:   Procedure Laterality Date    CHOLECYSTECTOMY      ROTATOR CUFF REPAIR Right      Family History   Problem Relation Name Age of Onset    Heart disease Mother Eugenia Aguilar         Heart Procedure-unknown    No Known Problems Sister Trixie     No Known Problems Brother Jesus     No Known Problems Daughter Zoe     Asthma Son Tim        Review of Systems   Constitutional:  Negative for activity change, appetite change, diaphoresis, fever and unexpected weight change.   HENT:  Negative for hearing loss, rhinorrhea and trouble swallowing.    Eyes:  Negative for discharge and visual disturbance.   Respiratory:  Positive for apnea. Negative for cough, chest tightness and wheezing.         Snoring at night with apneic episodes.   Cardiovascular:  Positive for leg swelling. Negative for chest pain and palpitations.   Gastrointestinal:  Negative for abdominal distention, abdominal pain, blood in stool, constipation, diarrhea and vomiting.   Genitourinary:  Negative for difficulty urinating, hematuria and urgency.   Musculoskeletal:  Negative for arthralgias, joint swelling and neck pain.   Neurological:  Negative for seizures, weakness and headaches.  "  Psychiatric/Behavioral:  Negative for confusion and dysphoric mood.          Objective:      Blood pressure 125/87, pulse 68, height 5' 10" (1.778 m), weight 133.8 kg (295 lb). Body mass index is 42.33 kg/m².  Physical Exam  Constitutional:       General: He is not in acute distress.     Appearance: Normal appearance. He is obese. He is not ill-appearing, toxic-appearing or diaphoretic.      Comments: BMI is 42.33   HENT:      Mouth/Throat:      Comments: Oral airways are Mallampati score 4.  Eyes:      General: No scleral icterus.  Neck:      Comments: Slightly wide neck  Cardiovascular:      Rate and Rhythm: Normal rate and regular rhythm.      Pulses: Normal pulses.      Heart sounds: Normal heart sounds.   Pulmonary:      Effort: Pulmonary effort is normal.      Breath sounds: Normal breath sounds.   Abdominal:      General: There is no distension.      Palpations: Abdomen is soft.      Tenderness: There is no abdominal tenderness.      Comments: Truncal obesity   Musculoskeletal:         General: No swelling or tenderness.      Cervical back: No rigidity.      Right lower leg: No edema.      Left lower leg: No edema.   Lymphadenopathy:      Cervical: No cervical adenopathy.   Skin:     General: Skin is warm and dry.   Neurological:      Mental Status: He is alert. Mental status is at baseline.   Psychiatric:         Behavior: Behavior normal.      Comments: Generally quiet.           Assessment:       No visits with results within 3 Month(s) from this visit.   Latest known visit with results is:   Lab Visit on 12/07/2023   Component Date Value Ref Range Status    Cholesterol 12/07/2023 192  120 - 199 mg/dL Final    Triglycerides 12/07/2023 71  30 - 150 mg/dL Final    HDL 12/07/2023 82 (H)  40 - 75 mg/dL Final    LDL Cholesterol 12/07/2023 95.8  63.0 - 159.0 mg/dL Final    HDL/Cholesterol Ratio 12/07/2023 42.7  20.0 - 50.0 % Final    Total Cholesterol/HDL Ratio 12/07/2023 2.3  2.0 - 5.0 Final    Non-HDL " Cholesterol 12/07/2023 110  mg/dL Final    Hemoglobin A1C 12/07/2023 5.6  4.5 - 6.2 % Final    Estimated Avg Glucose 12/07/2023 114  68 - 131 mg/dL Final    Sodium 12/07/2023 138  136 - 145 mmol/L Final    Potassium 12/07/2023 4.6  3.5 - 5.1 mmol/L Final    Chloride 12/07/2023 101  95 - 110 mmol/L Final    CO2 12/07/2023 30 (H)  23 - 29 mmol/L Final    Glucose 12/07/2023 85  70 - 110 mg/dL Final    BUN 12/07/2023 10  6 - 20 mg/dL Final    Creatinine 12/07/2023 1.0  0.5 - 1.4 mg/dL Final    Calcium 12/07/2023 9.6  8.7 - 10.5 mg/dL Final    Anion Gap 12/07/2023 7 (L)  8 - 16 mmol/L Final    eGFR 12/07/2023 >60.0  >60 mL/min/1.73 m^2 Final    TSH 12/07/2023 1.435  0.340 - 5.600 uIU/mL Final    Specimen UA 12/07/2023 Urine, Clean Catch   Final    Color, UA 12/07/2023 Colorless (A)  Yellow, Straw, Liat Final    Appearance, UA 12/07/2023 Clear  Clear Final    pH, UA 12/07/2023 7.0  5.0 - 8.0 Final    Specific Gravity, UA 12/07/2023 1.010  1.005 - 1.030 Final    Protein, UA 12/07/2023 Negative  Negative Final    Glucose, UA 12/07/2023 Negative  Negative Final    Ketones, UA 12/07/2023 Negative  Negative Final    Bilirubin (UA) 12/07/2023 Negative  Negative Final    Occult Blood UA 12/07/2023 Negative  Negative Final    Nitrite, UA 12/07/2023 Negative  Negative Final    Urobilinogen, UA 12/07/2023 Negative  Negative EU/dL Final    Leukocytes, UA 12/07/2023 Negative  Negative Final       1. Snoring  -     Ambulatory referral/consult to Pulmonology; Future; Expected date: 06/17/2024    2. Apnea for greater than 15 seconds  -     Ambulatory referral/consult to Pulmonology; Future; Expected date: 06/17/2024    3. Class 3 severe obesity due to excess calories without serious comorbidity with body mass index (BMI) of 40.0 to 44.9 in adult  Assessment & Plan:  BMI Greater than 40      4. Numbness and tingling in left arm             Plan:   Snoring  -     Ambulatory referral/consult to Pulmonology; Future; Expected date:  06/17/2024    Apnea for greater than 15 seconds  -     Ambulatory referral/consult to Pulmonology; Future; Expected date: 06/17/2024    Class 3 severe obesity due to excess calories without serious comorbidity with body mass index (BMI) of 40.0 to 44.9 in adult    Numbness and tingling in left arm      Patient's numbness has resolved.    He is taking attempts to watch his diet and lose weight.      Concerns about sleep apnea given his opacity, snoring and cessation of breathing seconds has been as per secondary information soaps his wife    Will make a formal sleep referral copy of labs also a referral and this will be will be made to Dr. Andres Gabriel.  The nature of potential evaluation and testing involving a sleep lab and fitting with monitors and nature of treatment including CPAP devices or nasal PAP devices or auto flex devices also briefly discussed within the purview of knowledge and expertise the encouraged him to lose about 6 lb of weight when he comes back for annual in 6 months time.    Copy of labs including thyroid tests also given.       Follow up in about 6 months (around 12/10/2024) for Preventive Physical.    No current outpatient medications on file.    Simone Egan

## 2024-06-18 ENCOUNTER — OFFICE VISIT (OUTPATIENT)
Dept: FAMILY MEDICINE | Facility: CLINIC | Age: 44
End: 2024-06-18
Payer: COMMERCIAL

## 2024-06-18 VITALS
SYSTOLIC BLOOD PRESSURE: 128 MMHG | HEART RATE: 78 BPM | DIASTOLIC BLOOD PRESSURE: 80 MMHG | WEIGHT: 297.63 LBS | OXYGEN SATURATION: 98 % | BODY MASS INDEX: 42.61 KG/M2 | HEIGHT: 70 IN

## 2024-06-18 DIAGNOSIS — M54.9 ACUTE MID BACK PAIN: Primary | ICD-10-CM

## 2024-06-18 PROCEDURE — 99214 OFFICE O/P EST MOD 30 MIN: CPT | Mod: S$GLB,,, | Performed by: INTERNAL MEDICINE

## 2024-06-18 PROCEDURE — 3079F DIAST BP 80-89 MM HG: CPT | Mod: CPTII,S$GLB,, | Performed by: INTERNAL MEDICINE

## 2024-06-18 PROCEDURE — 1160F RVW MEDS BY RX/DR IN RCRD: CPT | Mod: CPTII,S$GLB,, | Performed by: INTERNAL MEDICINE

## 2024-06-18 PROCEDURE — 99999 PR PBB SHADOW E&M-EST. PATIENT-LVL III: CPT | Mod: PBBFAC,,, | Performed by: INTERNAL MEDICINE

## 2024-06-18 PROCEDURE — 1159F MED LIST DOCD IN RCRD: CPT | Mod: CPTII,S$GLB,, | Performed by: INTERNAL MEDICINE

## 2024-06-18 PROCEDURE — 3008F BODY MASS INDEX DOCD: CPT | Mod: CPTII,S$GLB,, | Performed by: INTERNAL MEDICINE

## 2024-06-18 PROCEDURE — 3074F SYST BP LT 130 MM HG: CPT | Mod: CPTII,S$GLB,, | Performed by: INTERNAL MEDICINE

## 2024-06-18 RX ORDER — NAPROXEN 500 MG/1
500 TABLET ORAL 2 TIMES DAILY
Qty: 14 TABLET | Refills: 0 | Status: SHIPPED | OUTPATIENT
Start: 2024-06-18 | End: 2024-06-25

## 2024-06-18 RX ORDER — CYCLOBENZAPRINE HCL 5 MG
5-10 TABLET ORAL 3 TIMES DAILY PRN
Qty: 30 TABLET | Refills: 1 | Status: SHIPPED | OUTPATIENT
Start: 2024-06-18 | End: 2024-06-28

## 2024-06-18 NOTE — PROGRESS NOTES
"Assessment and Plan:    1. Acute mid back pain  Discussed that this was most likely muscular injury from twisting while playing golf the day before pain started. No red flags in history or on exam. Can treat with NSAID and muscle relaxer. If not improving in the next week, will let me know.   - cyclobenzaprine (FLEXERIL) 5 MG tablet; Take 1-2 tablets (5-10 mg total) by mouth 3 (three) times daily as needed for Muscle spasms.  Dispense: 30 tablet; Refill: 1  - naproxen (NAPROSYN) 500 MG tablet; Take 1 tablet (500 mg total) by mouth 2 (two) times daily. for 7 days  Dispense: 14 tablet; Refill: 0    ______________________________________________________________________  Subjective:    Chief Complaint:  Acute back pain    HPI:  Aldo is a 44 y.o. year old male here for evaluation of acute back pain    He is new to me he is a patient of Simone Egan MD.    Back pain was present upon waking up 3 days ago. Had initially thought that he just slept wrong but it has not gone away. He had gone golfing the day before this started. Pain has gradually worsened since this started. Pain is on both sides, not midline. Pain does not radiate up or down. He has tried taking tylenol and aleve, both helped only minimally. Tried heating pad which helps partially but only while he is using it.     Medications:  No current outpatient medications on file prior to visit.     No current facility-administered medications on file prior to visit.       Review of Systems:  Review of Systems   Constitutional:  Negative for chills and fever.   Musculoskeletal:  Positive for back pain. Negative for gait problem.   Neurological:  Negative for weakness and numbness.       Past Medical History:  No past medical history on file.    Objective:    Vitals:  Vitals:    06/18/24 1447   BP: 128/80   Pulse: 78   SpO2: 98%   Weight: 135 kg (297 lb 9.9 oz)   Height: 5' 10" (1.778 m)   PainSc:   7   PainLoc: Back       Physical Exam  Vitals reviewed. "   Constitutional:       General: He is not in acute distress.     Appearance: He is well-developed.   Eyes:      Conjunctiva/sclera: Conjunctivae normal.   Cardiovascular:      Rate and Rhythm: Normal rate and regular rhythm.   Pulmonary:      Effort: Pulmonary effort is normal. No respiratory distress.   Musculoskeletal:        Back:    Skin:     General: Skin is warm and dry.   Neurological:      Mental Status: He is alert and oriented to person, place, and time.   Psychiatric:         Mood and Affect: Mood normal.         Behavior: Behavior normal.         Data:  Last Cr normal      Anyi Lazaro MD  Internal Medicine

## 2024-06-18 NOTE — LETTER
June 18, 2024    Aldo Aguilar  [address] [place] [address]             [facility]  Family Medicine  [address]  [place] [address]  Phone: [phone]  Fax: [phone]   June 18, 2024     Patient: Aldo Aguliar   YOB: 1980   Date of Visit: 6/18/2024       To Whom it May Concern:    Aldo Aguilar was seen in my clinic on 6/18/2024. He may return to work on 06/19/2024 .    Please excuse him from work on 06/17/2024 to 06/18/2024.    If you have any questions or concerns, please don't hesitate to call.    Sincerely,         Anyi Lazaro MD or RAMANDEEP Bauer

## 2024-10-30 ENCOUNTER — PATIENT MESSAGE (OUTPATIENT)
Dept: RESEARCH | Facility: HOSPITAL | Age: 44
End: 2024-10-30
Payer: COMMERCIAL

## 2024-11-04 ENCOUNTER — PATIENT MESSAGE (OUTPATIENT)
Dept: RESEARCH | Facility: HOSPITAL | Age: 44
End: 2024-11-04
Payer: COMMERCIAL

## 2025-01-07 ENCOUNTER — OFFICE VISIT (OUTPATIENT)
Dept: FAMILY MEDICINE | Facility: CLINIC | Age: 45
End: 2025-01-07
Payer: COMMERCIAL

## 2025-01-07 ENCOUNTER — LAB VISIT (OUTPATIENT)
Dept: LAB | Facility: HOSPITAL | Age: 45
End: 2025-01-07
Attending: NURSE PRACTITIONER
Payer: COMMERCIAL

## 2025-01-07 ENCOUNTER — PATIENT MESSAGE (OUTPATIENT)
Dept: GASTROENTEROLOGY | Facility: CLINIC | Age: 45
End: 2025-01-07
Payer: COMMERCIAL

## 2025-01-07 VITALS
HEART RATE: 72 BPM | OXYGEN SATURATION: 98 % | BODY MASS INDEX: 43.95 KG/M2 | WEIGHT: 307 LBS | DIASTOLIC BLOOD PRESSURE: 76 MMHG | TEMPERATURE: 99 F | HEIGHT: 70 IN | SYSTOLIC BLOOD PRESSURE: 120 MMHG

## 2025-01-07 DIAGNOSIS — Z00.00 ANNUAL PHYSICAL EXAM: Primary | ICD-10-CM

## 2025-01-07 DIAGNOSIS — Z13.1 DIABETES MELLITUS SCREENING: ICD-10-CM

## 2025-01-07 DIAGNOSIS — R06.83 SNORING: ICD-10-CM

## 2025-01-07 DIAGNOSIS — E66.813 CLASS 3 SEVERE OBESITY DUE TO EXCESS CALORIES WITH SERIOUS COMORBIDITY AND BODY MASS INDEX (BMI) OF 40.0 TO 44.9 IN ADULT: ICD-10-CM

## 2025-01-07 DIAGNOSIS — Z12.11 COLON CANCER SCREENING: ICD-10-CM

## 2025-01-07 DIAGNOSIS — Z13.220 LIPID SCREENING: ICD-10-CM

## 2025-01-07 DIAGNOSIS — E66.01 CLASS 3 SEVERE OBESITY DUE TO EXCESS CALORIES WITH SERIOUS COMORBIDITY AND BODY MASS INDEX (BMI) OF 40.0 TO 44.9 IN ADULT: ICD-10-CM

## 2025-01-07 LAB
ALBUMIN SERPL BCP-MCNC: 3.9 G/DL (ref 3.5–5.2)
ALP SERPL-CCNC: 74 U/L (ref 40–150)
ALT SERPL W/O P-5'-P-CCNC: 33 U/L (ref 10–44)
ANION GAP SERPL CALC-SCNC: 10 MMOL/L (ref 8–16)
AST SERPL-CCNC: 33 U/L (ref 10–40)
BILIRUB SERPL-MCNC: 0.9 MG/DL (ref 0.1–1)
BUN SERPL-MCNC: 12 MG/DL (ref 6–20)
CALCIUM SERPL-MCNC: 9.4 MG/DL (ref 8.7–10.5)
CHLORIDE SERPL-SCNC: 104 MMOL/L (ref 95–110)
CHOLEST SERPL-MCNC: 197 MG/DL (ref 120–199)
CHOLEST/HDLC SERPL: 2.9 {RATIO} (ref 2–5)
CO2 SERPL-SCNC: 25 MMOL/L (ref 23–29)
CREAT SERPL-MCNC: 1 MG/DL (ref 0.5–1.4)
EST. GFR  (NO RACE VARIABLE): >60 ML/MIN/1.73 M^2
ESTIMATED AVG GLUCOSE: 105 MG/DL (ref 68–131)
GLUCOSE SERPL-MCNC: 78 MG/DL (ref 70–110)
HBA1C MFR BLD: 5.3 % (ref 4–5.6)
HDLC SERPL-MCNC: 67 MG/DL (ref 40–75)
HDLC SERPL: 34 % (ref 20–50)
LDLC SERPL CALC-MCNC: 116.2 MG/DL (ref 63–159)
NONHDLC SERPL-MCNC: 130 MG/DL
POTASSIUM SERPL-SCNC: 4.4 MMOL/L (ref 3.5–5.1)
PROT SERPL-MCNC: 8.6 G/DL (ref 6–8.4)
SODIUM SERPL-SCNC: 139 MMOL/L (ref 136–145)
TRIGL SERPL-MCNC: 69 MG/DL (ref 30–150)
TSH SERPL DL<=0.005 MIU/L-ACNC: 0.96 UIU/ML (ref 0.4–4)

## 2025-01-07 PROCEDURE — 80053 COMPREHEN METABOLIC PANEL: CPT | Performed by: NURSE PRACTITIONER

## 2025-01-07 PROCEDURE — 1160F RVW MEDS BY RX/DR IN RCRD: CPT | Mod: CPTII,S$GLB,, | Performed by: NURSE PRACTITIONER

## 2025-01-07 PROCEDURE — 3008F BODY MASS INDEX DOCD: CPT | Mod: CPTII,S$GLB,, | Performed by: NURSE PRACTITIONER

## 2025-01-07 PROCEDURE — 80061 LIPID PANEL: CPT | Performed by: NURSE PRACTITIONER

## 2025-01-07 PROCEDURE — 36415 COLL VENOUS BLD VENIPUNCTURE: CPT | Performed by: NURSE PRACTITIONER

## 2025-01-07 PROCEDURE — 99396 PREV VISIT EST AGE 40-64: CPT | Mod: S$GLB,,, | Performed by: NURSE PRACTITIONER

## 2025-01-07 PROCEDURE — 83036 HEMOGLOBIN GLYCOSYLATED A1C: CPT | Performed by: NURSE PRACTITIONER

## 2025-01-07 PROCEDURE — 84443 ASSAY THYROID STIM HORMONE: CPT | Performed by: NURSE PRACTITIONER

## 2025-01-07 PROCEDURE — 3078F DIAST BP <80 MM HG: CPT | Mod: CPTII,S$GLB,, | Performed by: NURSE PRACTITIONER

## 2025-01-07 PROCEDURE — 1159F MED LIST DOCD IN RCRD: CPT | Mod: CPTII,S$GLB,, | Performed by: NURSE PRACTITIONER

## 2025-01-07 PROCEDURE — 99999 PR PBB SHADOW E&M-EST. PATIENT-LVL IV: CPT | Mod: PBBFAC,,, | Performed by: NURSE PRACTITIONER

## 2025-01-07 PROCEDURE — 3074F SYST BP LT 130 MM HG: CPT | Mod: CPTII,S$GLB,, | Performed by: NURSE PRACTITIONER

## 2025-01-07 NOTE — PROGRESS NOTES
HPI: Aldo Aguilar  is a 44 y.o. male who presents for annual physical .  No complaints at this time.  History of Present Illness    CHIEF COMPLAINT:  Patient presents for an annual wellness checkup.    HPI:  Patient reports generally good health with no significant issues. He mentions a history of snoring, which has been noticed by his wife. Patient reports past episodes of waking up choking or gasping for breath, though this appears to be sporadic and not a current ongoing issue. Patient denies any daytime fatigue and reports feeling rested when waking up in the morning, describing himself as an early riser. He denies frequent nighttime awakenings and describes his sleep as restful. A previous recommendation was made by Dr. Diaz for the patient to see a sleep apnea specialist, but the recommended doctor (Dr. Carter) has since retired and is no longer accepting patients.    Patient denies any family history of thyroid disease or colon cancer.    MEDICAL HISTORY:  Patient has a history of possible sleep apnea.    FAMILY HISTORY:  Family history is significant for no known thyroid disease or colon cancer.    TEST RESULTS:  Patient's Hemoglobin A1C was 5.6 about a year ago, which was within normal range. Basic lab work conducted around the same time also showed results within normal limits.    SOCIAL HISTORY:  Patient is .      ROS:  General: -fever, -chills, -fatigue, -weight gain, -weight loss  Eyes: -vision changes, -redness, -discharge  ENT: -ear pain, -nasal congestion, -sore throat, +snoring  Cardiovascular: -chest pain, -palpitations, -lower extremity edema  Respiratory: -cough, -shortness of breath  Gastrointestinal: -abdominal pain, -nausea, -vomiting, -diarrhea, -constipation, -blood in stool  Genitourinary: -dysuria, -hematuria, -frequency  Musculoskeletal: -joint pain, -muscle pain  Skin: -rash, -lesion  Neurological: -headache, -dizziness, -numbness, -tingling  Psychiatric: -anxiety,  -depression, -sleep difficulty         Review of patient's allergies indicates:   Allergen Reactions    Shellfish containing products Swelling    Fish containing products Swelling     History reviewed. No pertinent past medical history.  Past Surgical History:   Procedure Laterality Date    CHOLECYSTECTOMY      ROTATOR CUFF REPAIR Right      Family History   Problem Relation Name Age of Onset    Heart disease Mother Eugenia Aguilar         Heart Procedure-unknown    No Known Problems Sister Trixie     No Known Problems Brother Jesus     No Known Problems Daughter Zoe     Asthma Son Tim      Social History     Tobacco Use    Smoking status: Never    Smokeless tobacco: Never   Substance Use Topics    Alcohol use: Yes     Comment: occasionally    Drug use: No      Health Maintenance Topics with due status: Not Due       Topic Last Completion Date    TETANUS VACCINE 08/04/2020    Hemoglobin A1c (Diabetic Prevention Screening) 12/07/2023    Lipid Panel 12/07/2023    RSV Vaccine (Age 60+ and Pregnant patients) Not Due     Immunization History   Administered Date(s) Administered    COVID-19, MRNA, LN-S, PF (Pfizer) (Purple Cap) 05/17/2021, 06/07/2021    Influenza - Quadrivalent - MDCK - PF 11/01/2021    Tdap 08/04/2020     OBJECTIVE:      Vitals:    01/07/25 0742   BP: 120/76   Pulse: 72   Temp: 98.6 °F (37 °C)     Physical Exam    General: No acute distress. Well-developed. Well-nourished.  Eyes: EOMI. Sclerae anicteric.  HENT: Normocephalic. Atraumatic. Nares patent. Moist oral mucosa.  Ears: Bilateral TMs clear. Bilateral EACs clear.  Cardiovascular: Regular rate. Regular rhythm. No murmurs. No rubs. No gallops. Normal S1, S2.  Respiratory: Normal respiratory effort. Clear to auscultation bilaterally. No rales. No rhonchi. No wheezing.  Abdomen: Soft. Non-tender. Non-distended. Normoactive bowel sounds.  Musculoskeletal: No  obvious deformity.  Extremities: No lower extremity edema.  Neurological: Alert &  oriented x3. No slurred speech. Normal gait.  Psychiatric: Normal mood. Normal affect. Good insight. Good judgment.  Skin: Warm. Dry. No rash.         Assessment:       Assessment & Plan    - Recommend annual wellness lab work including HbA1c, metabolic panel, and cholesterol check  - Discussed colon cancer screening options in preparation for patient turning 45  - Evaluated for potential sleep apnea based on reported symptoms and previous recommendation from Dr. Egan  - Added TSH to lab work to screen for thyroid issues before potential sleep study    CLASS 3:  - Recommend early colon cancer screening at age 45  - Ordered comprehensive labs including diabetes screening, metabolic panel, and cholesterol check to monitor obesity-related health markers.  - Advised sleep apnea evaluation considering high BMI and reported snoring.    HEALTH SCREENING:  - Reviewed previous lab work from a year ago, which showed normal results, including hemoglobin A1C of 5.6 and normal thyroid function.  - Instructed the patient to follow up after lab results are available on [employer] and to contact the office with any questions or concerns.    APNEA EVALUATION:  - Patient reports sporadic breathing stops during sleep, as noticed by spouse, as well as previous episodes of waking up choking or gasping for breath.  - However, the patient denies daytime fatigue, reports feeling rested in the morning, and experiences solid sleep without frequent nighttime awakenings.  - Acknowledged previous recommendation for sleep apnea specialist visit.    CANCER SCREENING:  - Explained colon cancer screening options: 1.  - Colonoscopy: Detailed procedure information, preparation process, sedation, and 10-year follow-up if clear. 2.  - Cologuard: At-home test process, 3-year follow-up if negative, colonoscopy required if positive.  - Discussed the low risk of developing colon cancer within 10 years after a clear colonoscopy.  - Patient denies family  history of colon cancer and chose colonoscopy for screening.  - Planned to place orders for colonoscopy to be scheduled around the patient's upcoming birthday in February.  - Instructed to follow up to schedule the procedure.         Plan:       Annual physical exam  Completed    Diabetes mellitus screening  -     Hemoglobin A1C; Future; Expected date: 01/07/2025  -     COMPREHENSIVE METABOLIC PANEL; Future; Expected date: 01/07/2025    Lipid screening  -     LIPID PANEL; Future; Expected date: 01/07/2025    Colon cancer screening  -     Ambulatory referral/consult to Gastroenterology; Future; Expected date: 01/14/2025    Snoring  -     Ambulatory referral/consult to Sleep Disorders; Future; Expected date: 01/14/2025  -     TSH; Future; Expected date: 01/07/2025    Class 3 severe obesity due to excess calories with serious comorbidity and body mass index (BMI) of 40.0 to 44.9 in adult  The patient is asked to make an attempt to improve diet and exercise patterns to aid in medical management of this problem.        Patient counseled on age appropriate medical preventative services, age appropriate cancer screenings, nutrition, healthy diet, consistent exercise regimen and maintaining an active lifestyle.      Counseled on age appropriate vaccines and discussed upcoming health care needs based on age/gender.  Spent time with patient counseling on need for a good patient/doctor relationship moving forward.  Discussed use of common OTC medications and supplements.  Discussed common dietary aids and use of caffeine and the need for good sleep hygiene and stress management.    Follow up in about 1 year (around 1/7/2026) for Annual Wellness.        This note was generated with the assistance of ambient listening technology. Verbal consent was obtained by the patient and accompanying visitor(s) for the recording of patient appointment to facilitate this note. I attest to having reviewed and edited the generated note for  accuracy, though some syntax or spelling errors may persist. Please contact the author of this note for any clarification.      1/7/2025 ANJEL Walls, SHERI-C

## 2025-03-24 ENCOUNTER — PATIENT MESSAGE (OUTPATIENT)
Dept: FAMILY MEDICINE | Facility: CLINIC | Age: 45
End: 2025-03-24
Payer: COMMERCIAL

## 2025-03-25 ENCOUNTER — PATIENT OUTREACH (OUTPATIENT)
Dept: ADMINISTRATIVE | Facility: HOSPITAL | Age: 45
End: 2025-03-25
Payer: COMMERCIAL

## 2025-03-25 NOTE — PROGRESS NOTES
Population Health Chart Review & Patient Outreach Details      Additional [facility] Health Notes:               Updates Requested / Reviewed:      Updated Care Coordination Note, Care Everywhere, , and Immunizations Reconciliation Completed or Queried: [place]; Care Team updated.         Health Maintenance Topics Overdue:      VB Score: 1     Colon Cancer Screening                       Health Maintenance Topic(s) Outreach Outcomes & Actions Taken:    Colorectal Cancer Screening - Outreach Outcomes & Actions Taken  : Pt Will Schedule with External Provider / Order Routed & Care Team Updated if Applicable

## (undated) DEVICE — SUT MONOCRYL 4-0 PS-2

## (undated) DEVICE — DISSECTOR CURVED 5DCD

## (undated) DEVICE — PACK CUSTOM ENDO CHOLO SLI

## (undated) DEVICE — BAG TISS RETRV MONARCH 10MM

## (undated) DEVICE — SEE MEDLINE ITEM 157117

## (undated) DEVICE — KIT ANTIFOG

## (undated) DEVICE — TUBING PNEUMO

## (undated) DEVICE — CANNULA ENDOPATH XCEL 5X100MM

## (undated) DEVICE — LINER SUCTION 3000CC

## (undated) DEVICE — NDL SAFETY 21G X 1 1/2 ECLPSE

## (undated) DEVICE — SCISSOR CURVED ENDOPATH 5MM

## (undated) DEVICE — CLOSURE SKIN STERI STRIP 1/2X4

## (undated) DEVICE — STRAP OR TABLE 5IN X 72IN

## (undated) DEVICE — APPLIER CLIP ENDO LIGAMAX 5MM

## (undated) DEVICE — STRIP STERI REIN CLSR 1/2X2IN

## (undated) DEVICE — ELECTRODE REM PLYHSV RETURN 9

## (undated) DEVICE — SLEEVE SCD EXPRESS CALF MEDIUM

## (undated) DEVICE — SOL IRR NACL .9% 3000ML

## (undated) DEVICE — APPLICATOR CHLORAPREP ORN 26ML

## (undated) DEVICE — IRRIGATOR SUCTION W/TIP

## (undated) DEVICE — SOL WATER STRL IRR 1000ML

## (undated) DEVICE — SUT 0 VICRYL / UR6 (J603)

## (undated) DEVICE — GLOVE SURG ULTRA TOUCH 7.5